# Patient Record
Sex: FEMALE | Race: BLACK OR AFRICAN AMERICAN | NOT HISPANIC OR LATINO | Employment: PART TIME | URBAN - METROPOLITAN AREA
[De-identification: names, ages, dates, MRNs, and addresses within clinical notes are randomized per-mention and may not be internally consistent; named-entity substitution may affect disease eponyms.]

---

## 2023-03-02 PROBLEM — E11.9 CONTROLLED TYPE 2 DIABETES MELLITUS WITHOUT COMPLICATION, WITH LONG-TERM CURRENT USE OF INSULIN (MULTI): Status: ACTIVE | Noted: 2023-03-02

## 2023-03-02 PROBLEM — E66.811 CLASS 1 OBESITY WITH BODY MASS INDEX (BMI) OF 32.0 TO 32.9 IN ADULT: Status: ACTIVE | Noted: 2023-03-02

## 2023-03-02 PROBLEM — Z79.4 CONTROLLED TYPE 2 DIABETES MELLITUS WITHOUT COMPLICATION, WITH LONG-TERM CURRENT USE OF INSULIN (MULTI): Status: ACTIVE | Noted: 2023-03-02

## 2023-03-02 PROBLEM — F11.90 OPIOID USE: Status: ACTIVE | Noted: 2023-03-02

## 2023-03-02 PROBLEM — R06.2 WHEEZING ON AUSCULTATION: Status: ACTIVE | Noted: 2023-03-02

## 2023-03-02 PROBLEM — J30.9 ALLERGIC RHINITIS: Status: ACTIVE | Noted: 2023-03-02

## 2023-03-02 PROBLEM — N95.1 MENOPAUSAL SYMPTOMS: Status: ACTIVE | Noted: 2023-03-02

## 2023-03-02 PROBLEM — I10 HYPERTENSION, BENIGN: Status: ACTIVE | Noted: 2023-03-02

## 2023-03-02 PROBLEM — R23.2 HOT FLASHES: Status: ACTIVE | Noted: 2023-03-02

## 2023-03-02 PROBLEM — M54.50 BACK PAIN, LUMBOSACRAL: Status: ACTIVE | Noted: 2023-03-02

## 2023-03-02 PROBLEM — M25.531 RIGHT WRIST PAIN: Status: ACTIVE | Noted: 2023-03-02

## 2023-03-02 PROBLEM — K21.00 GERD WITH ESOPHAGITIS: Status: ACTIVE | Noted: 2023-03-02

## 2023-03-02 PROBLEM — E66.9 CLASS 1 OBESITY WITH BODY MASS INDEX (BMI) OF 32.0 TO 32.9 IN ADULT: Status: ACTIVE | Noted: 2023-03-02

## 2023-03-02 PROBLEM — B35.1 ONYCHOMYCOSIS: Status: ACTIVE | Noted: 2023-03-02

## 2023-03-02 PROBLEM — R05.9 COUGH: Status: ACTIVE | Noted: 2023-03-02

## 2023-03-02 PROBLEM — J18.9 COMMUNITY ACQUIRED PNEUMONIA: Status: ACTIVE | Noted: 2023-03-02

## 2023-03-02 PROBLEM — L98.9 SKIN LESION OF BACK: Status: ACTIVE | Noted: 2023-03-02

## 2023-03-02 PROBLEM — J10.1 INFLUENZA A: Status: ACTIVE | Noted: 2023-03-02

## 2023-03-02 RX ORDER — CYCLOBENZAPRINE HCL 10 MG
10 TABLET ORAL 2 TIMES DAILY PRN
COMMUNITY
Start: 2021-06-19 | End: 2023-06-09 | Stop reason: SDUPTHER

## 2023-03-02 RX ORDER — BLOOD-GLUCOSE METER
EACH MISCELLANEOUS 2 TIMES DAILY
COMMUNITY
Start: 2021-02-22

## 2023-03-02 RX ORDER — POTASSIUM CHLORIDE 20 MEQ/1
1 TABLET, EXTENDED RELEASE ORAL DAILY
COMMUNITY
End: 2024-01-12 | Stop reason: SDUPTHER

## 2023-03-02 RX ORDER — PANTOPRAZOLE SODIUM 40 MG/1
1 TABLET, DELAYED RELEASE ORAL DAILY
COMMUNITY
Start: 2020-11-30 | End: 2023-03-23 | Stop reason: SDUPTHER

## 2023-03-02 RX ORDER — LISINOPRIL AND HYDROCHLOROTHIAZIDE 12.5; 2 MG/1; MG/1
1 TABLET ORAL DAILY
COMMUNITY
Start: 2022-09-13 | End: 2023-03-23 | Stop reason: SDUPTHER

## 2023-03-02 RX ORDER — MOMETASONE FUROATE 50 UG/1
1 SPRAY, METERED NASAL DAILY
COMMUNITY
Start: 2020-11-30

## 2023-03-02 RX ORDER — HUMAN INSULIN 100 [IU]/ML
60 INJECTION, SUSPENSION SUBCUTANEOUS DAILY
COMMUNITY
Start: 2020-10-08 | End: 2023-03-26 | Stop reason: DRUGHIGH

## 2023-03-02 RX ORDER — CICLOPIROX 80 MG/ML
SOLUTION TOPICAL
COMMUNITY
Start: 2022-10-13 | End: 2024-01-12 | Stop reason: WASHOUT

## 2023-03-02 RX ORDER — METFORMIN HYDROCHLORIDE 500 MG/1
2 TABLET, EXTENDED RELEASE ORAL 2 TIMES DAILY
COMMUNITY
Start: 2022-07-28 | End: 2024-01-12 | Stop reason: SINTOL

## 2023-03-02 RX ORDER — ROSUVASTATIN CALCIUM 5 MG/1
1 TABLET, COATED ORAL DAILY
COMMUNITY
Start: 2022-10-18 | End: 2024-01-12 | Stop reason: SINTOL

## 2023-03-02 RX ORDER — BLOOD SUGAR DIAGNOSTIC
STRIP MISCELLANEOUS DAILY
COMMUNITY

## 2023-03-02 RX ORDER — HYDROCODONE BITARTRATE AND ACETAMINOPHEN 5; 325 MG/1; MG/1
TABLET ORAL 3 TIMES DAILY PRN
COMMUNITY
Start: 2021-12-19 | End: 2023-03-20

## 2023-03-02 RX ORDER — IBUPROFEN 800 MG/1
1 TABLET ORAL 3 TIMES DAILY PRN
COMMUNITY
Start: 2020-11-30 | End: 2023-03-23 | Stop reason: SDUPTHER

## 2023-03-02 RX ORDER — ASPIRIN 81 MG/1
1 TABLET ORAL DAILY
COMMUNITY
Start: 2020-11-02

## 2023-03-20 ENCOUNTER — OFFICE VISIT (OUTPATIENT)
Dept: PRIMARY CARE | Facility: CLINIC | Age: 68
End: 2023-03-20
Payer: MEDICARE

## 2023-03-20 VITALS
SYSTOLIC BLOOD PRESSURE: 130 MMHG | RESPIRATION RATE: 16 BRPM | BODY MASS INDEX: 31.89 KG/M2 | HEIGHT: 63 IN | HEART RATE: 102 BPM | OXYGEN SATURATION: 95 % | WEIGHT: 180 LBS | DIASTOLIC BLOOD PRESSURE: 70 MMHG

## 2023-03-20 DIAGNOSIS — Z79.4 CONTROLLED TYPE 2 DIABETES MELLITUS WITHOUT COMPLICATION, WITH LONG-TERM CURRENT USE OF INSULIN (MULTI): Primary | ICD-10-CM

## 2023-03-20 DIAGNOSIS — E11.9 CONTROLLED TYPE 2 DIABETES MELLITUS WITHOUT COMPLICATION, WITH LONG-TERM CURRENT USE OF INSULIN (MULTI): Primary | ICD-10-CM

## 2023-03-20 DIAGNOSIS — F11.90 OPIOID USE: ICD-10-CM

## 2023-03-20 DIAGNOSIS — I10 HYPERTENSION, BENIGN: ICD-10-CM

## 2023-03-20 DIAGNOSIS — E66.9 CLASS 1 OBESITY WITH SERIOUS COMORBIDITY AND BODY MASS INDEX (BMI) OF 31.0 TO 31.9 IN ADULT, UNSPECIFIED OBESITY TYPE: ICD-10-CM

## 2023-03-20 DIAGNOSIS — K21.00 GASTROESOPHAGEAL REFLUX DISEASE WITH ESOPHAGITIS WITHOUT HEMORRHAGE: ICD-10-CM

## 2023-03-20 DIAGNOSIS — M54.50 BACK PAIN, LUMBOSACRAL: ICD-10-CM

## 2023-03-20 LAB — POC HEMOGLOBIN A1C: 11.5 % (ref 4.2–6.5)

## 2023-03-20 PROCEDURE — 1159F MED LIST DOCD IN RCRD: CPT | Performed by: FAMILY MEDICINE

## 2023-03-20 PROCEDURE — 3075F SYST BP GE 130 - 139MM HG: CPT | Performed by: FAMILY MEDICINE

## 2023-03-20 PROCEDURE — 99214 OFFICE O/P EST MOD 30 MIN: CPT | Performed by: FAMILY MEDICINE

## 2023-03-20 PROCEDURE — 3008F BODY MASS INDEX DOCD: CPT | Performed by: FAMILY MEDICINE

## 2023-03-20 PROCEDURE — 83036 HEMOGLOBIN GLYCOSYLATED A1C: CPT | Performed by: FAMILY MEDICINE

## 2023-03-20 PROCEDURE — 1160F RVW MEDS BY RX/DR IN RCRD: CPT | Performed by: FAMILY MEDICINE

## 2023-03-20 PROCEDURE — 1036F TOBACCO NON-USER: CPT | Performed by: FAMILY MEDICINE

## 2023-03-20 PROCEDURE — 3078F DIAST BP <80 MM HG: CPT | Performed by: FAMILY MEDICINE

## 2023-03-20 RX ORDER — HYDROCODONE BITARTRATE AND ACETAMINOPHEN 10; 325 MG/1; MG/1
1 TABLET ORAL EVERY 4 HOURS PRN
COMMUNITY
Start: 2022-06-06 | End: 2023-03-20 | Stop reason: SDUPTHER

## 2023-03-20 RX ORDER — NALOXONE HYDROCHLORIDE 1 MG/ML
0.4 INJECTION INTRAMUSCULAR; INTRAVENOUS; SUBCUTANEOUS AS NEEDED
Qty: 4 ML | Status: SHIPPED | OUTPATIENT
Start: 2023-03-20 | End: 2024-03-19

## 2023-03-20 RX ORDER — HYDROCODONE BITARTRATE AND ACETAMINOPHEN 10; 325 MG/1; MG/1
1 TABLET ORAL EVERY 4 HOURS PRN
Qty: 30 TABLET | Refills: 0 | Status: SHIPPED | OUTPATIENT
Start: 2023-03-20 | End: 2023-06-09 | Stop reason: SDUPTHER

## 2023-03-20 ASSESSMENT — PATIENT HEALTH QUESTIONNAIRE - PHQ9
2. FEELING DOWN, DEPRESSED OR HOPELESS: NOT AT ALL
1. LITTLE INTEREST OR PLEASURE IN DOING THINGS: NOT AT ALL
SUM OF ALL RESPONSES TO PHQ9 QUESTIONS 1 AND 2: 0

## 2023-03-20 ASSESSMENT — PAIN SCALES - GENERAL: PAINLEVEL: 6

## 2023-03-20 ASSESSMENT — ENCOUNTER SYMPTOMS: HYPERTENSION: 1

## 2023-03-20 NOTE — PROGRESS NOTES
Subjective   Patient ID: Brittany Brown is a 67 y.o. female who presents for Diabetes and Hypertension.  Having urinary frequency, and urgency, and may have a little leakage before gets there.  Since beginning of the year.  Is getting worse.  Mouth stays very dry.    Glucose 130-140 in AM  If eats sweets or lat in day, will be high in AM.  Has been eating a lot of vanilla tootsie rolls.    Right knee pain and ongoing back pain  Uses hydrocodone when bad.        Diabetes  She presents for her follow-up diabetic visit. She has type 2 diabetes mellitus. Her disease course has been worsening. There are no hypoglycemic associated symptoms. Associated symptoms include polyuria and weight loss. Pertinent negatives for diabetes include no blurred vision, no chest pain and no foot paresthesias. Risk factors for coronary artery disease include diabetes mellitus, dyslipidemia, hypertension and obesity.   Hypertension  This is a chronic problem. The current episode started more than 1 year ago. The problem is unchanged. The problem is controlled. Pertinent negatives include no blurred vision, chest pain or shortness of breath.   Pain  This is a chronic problem. The current episode started more than 1 year ago. The problem occurs intermittently. The problem is unchanged. The pain is present in the right knee and lower back. The pain is severe. Pertinent negatives include no chest pain or shortness of breath. Past treatments include prescription narcotic, rest, OTC NSAID, cold pack and acetaminophen. The treatment provided moderate relief. Her past medical history is significant for chronic back pain.       Review of Systems   Constitutional:  Positive for weight loss.   Eyes:  Negative for blurred vision.   Respiratory:  Negative for shortness of breath.    Cardiovascular:  Negative for chest pain.   Endocrine: Positive for polyuria.       Objective   /70 (BP Location: Right arm, Patient Position: Sitting, BP Cuff Size:  "Large adult)   Pulse 102   Resp 16   Ht 1.6 m (5' 3\")   Wt 81.6 kg (180 lb)   SpO2 95%   BMI 31.89 kg/m²     Physical Exam  Constitutional:       Appearance: Normal appearance.   Cardiovascular:      Rate and Rhythm: Normal rate and regular rhythm.      Heart sounds: No murmur heard.  Musculoskeletal:      Right lower leg: No edema.      Left lower leg: No edema.   Neurological:      Mental Status: She is alert.           Assessment/Plan   Problem List Items Addressed This Visit          Circulatory    Hypertension, benign     BP controlled.  Continue lisinopril-hydrochlorothiazide 20-25            Digestive    GERD with esophagitis     Taking pantoprazole.            Endocrine/Metabolic    Class 1 obesity with serious comorbidity and body mass index (BMI) of 31.0 to 31.9 in adult    Controlled type 2 diabetes mellitus without complication, with long-term current use of insulin (CMS/[facility]) - Primary     A1C 11.5, up from 10.3 in October.  Increase NPH insulin to 70 units daily.  Continue metformin  mg, 2 tabs daily.  Get back on track with diet, exercise.  Follow up 3 months.  If not coming down, will need to add medication, and consider [facility] pharmacy referral vs endocrinologist.         Relevant Medications    HYDROcodone-acetaminophen (Norco)  mg tablet    Other Relevant Orders    POCT glycosylated hemoglobin (Hb A1C) manually resulted (Completed)       Other    Back pain, lumbosacral     Using oxycodone periodically, when bad.   Urine Drug Screen and Controlled Substance Agreement  were done October 13, 2022.         Relevant Orders    Disability Placard    Opioid use    Relevant Medications    naloxone (Narcan) 1 mg/mL injection          "

## 2023-03-21 ENCOUNTER — TELEPHONE (OUTPATIENT)
Dept: PRIMARY CARE | Facility: CLINIC | Age: 68
End: 2023-03-21

## 2023-03-21 ENCOUNTER — APPOINTMENT (OUTPATIENT)
Dept: PRIMARY CARE | Facility: CLINIC | Age: 68
End: 2023-03-21
Payer: MEDICARE

## 2023-03-21 DIAGNOSIS — M54.50 BACK PAIN, LUMBOSACRAL: Primary | ICD-10-CM

## 2023-03-23 DIAGNOSIS — M54.50 BACK PAIN, LUMBOSACRAL: ICD-10-CM

## 2023-03-23 DIAGNOSIS — I10 HYPERTENSION, BENIGN: Primary | ICD-10-CM

## 2023-03-23 DIAGNOSIS — K21.00 GASTROESOPHAGEAL REFLUX DISEASE WITH ESOPHAGITIS WITHOUT HEMORRHAGE: ICD-10-CM

## 2023-03-23 RX ORDER — IBUPROFEN 800 MG/1
800 TABLET ORAL 3 TIMES DAILY PRN
Qty: 90 TABLET | Refills: 0 | Status: SHIPPED | OUTPATIENT
Start: 2023-03-23 | End: 2023-07-25 | Stop reason: SDUPTHER

## 2023-03-23 RX ORDER — PANTOPRAZOLE SODIUM 40 MG/1
40 TABLET, DELAYED RELEASE ORAL DAILY
Qty: 90 TABLET | Refills: 1 | Status: SHIPPED | OUTPATIENT
Start: 2023-03-23 | End: 2023-09-06 | Stop reason: SDUPTHER

## 2023-03-23 RX ORDER — LISINOPRIL AND HYDROCHLOROTHIAZIDE 12.5; 2 MG/1; MG/1
1 TABLET ORAL DAILY
Qty: 90 TABLET | Refills: 1 | Status: SHIPPED | OUTPATIENT
Start: 2023-03-23 | End: 2023-09-06 | Stop reason: SDUPTHER

## 2023-03-26 PROBLEM — J18.9 COMMUNITY ACQUIRED PNEUMONIA: Status: RESOLVED | Noted: 2023-03-02 | Resolved: 2023-03-26

## 2023-03-26 PROBLEM — R06.2 WHEEZING ON AUSCULTATION: Status: RESOLVED | Noted: 2023-03-02 | Resolved: 2023-03-26

## 2023-03-26 PROBLEM — J10.1 INFLUENZA A: Status: RESOLVED | Noted: 2023-03-02 | Resolved: 2023-03-26

## 2023-03-26 RX ORDER — HUMAN INSULIN 100 [IU]/ML
70 INJECTION, SUSPENSION SUBCUTANEOUS DAILY
Qty: 3 ML | Refills: 0 | Status: SHIPPED | OUTPATIENT
Start: 2023-03-26

## 2023-03-26 ASSESSMENT — ENCOUNTER SYMPTOMS
BLURRED VISION: 0
SHORTNESS OF BREATH: 0
WEIGHT LOSS: 1
PAIN: 1

## 2023-03-26 NOTE — ASSESSMENT & PLAN NOTE
A1C 11.5, up from 10.3 in October.  Increase NPH insulin to 70 units daily.  Continue metformin  mg, 2 tabs daily.  Get back on track with diet, exercise.  Follow up 3 months.  If not coming down, will need to add medication, and consider [facility] pharmacy referral vs endocrinologist.

## 2023-03-26 NOTE — ASSESSMENT & PLAN NOTE
Using oxycodone periodically, when bad.   Urine Drug Screen and Controlled Substance Agreement  were done October 13, 2022.

## 2023-03-26 NOTE — PATIENT INSTRUCTIONS
A1C 11.5, up from 10.3 in October.  Increase NPH insulin to 70 units daily.  Continue metformin  mg, 2 tabs daily.  Get back on track with diet, exercise.  Follow up 3 months.  If not coming down, will need to add medication, and consider [facility] pharmacy referral vs endocrinologist.    BP stable.  Continue current medicines.                                                                                                Blood Pressure Treatment goals include:                                                                                    BP of 120/80, with no higher than 140/85 on consistent basis.                               Exercise at 150 minutes weekly.  Eat a balanced diet, rich in fruits and vegetables, low in sodium and processed foods.  If you are overweight, work toward a goal BMI of less than 25.    Chronic intermittent back pain  Using oxycodone periodically, when bad.   Urine Drug Screen and Controlled Substance Agreement  were done October 13, 2022.  Narcan available for overdose.

## 2023-06-08 ENCOUNTER — TELEPHONE (OUTPATIENT)
Dept: PRIMARY CARE | Facility: CLINIC | Age: 68
End: 2023-06-08
Payer: MEDICARE

## 2023-06-09 DIAGNOSIS — E11.9 CONTROLLED TYPE 2 DIABETES MELLITUS WITHOUT COMPLICATION, WITH LONG-TERM CURRENT USE OF INSULIN (MULTI): ICD-10-CM

## 2023-06-09 DIAGNOSIS — Z79.4 CONTROLLED TYPE 2 DIABETES MELLITUS WITHOUT COMPLICATION, WITH LONG-TERM CURRENT USE OF INSULIN (MULTI): ICD-10-CM

## 2023-06-09 DIAGNOSIS — M54.50 BACK PAIN, LUMBOSACRAL: Primary | ICD-10-CM

## 2023-06-09 RX ORDER — HYDROCODONE BITARTRATE AND ACETAMINOPHEN 10; 325 MG/1; MG/1
1 TABLET ORAL EVERY 4 HOURS PRN
Qty: 30 TABLET | Refills: 0 | Status: SHIPPED | OUTPATIENT
Start: 2023-06-09 | End: 2023-07-25 | Stop reason: SDUPTHER

## 2023-06-09 RX ORDER — CYCLOBENZAPRINE HCL 10 MG
10 TABLET ORAL 2 TIMES DAILY PRN
Qty: 30 TABLET | Refills: 0 | Status: SHIPPED | OUTPATIENT
Start: 2023-06-09 | End: 2024-01-12 | Stop reason: SDUPTHER

## 2023-07-24 DIAGNOSIS — Z79.4 CONTROLLED TYPE 2 DIABETES MELLITUS WITHOUT COMPLICATION, WITH LONG-TERM CURRENT USE OF INSULIN (MULTI): Primary | ICD-10-CM

## 2023-07-24 DIAGNOSIS — E11.9 CONTROLLED TYPE 2 DIABETES MELLITUS WITHOUT COMPLICATION, WITH LONG-TERM CURRENT USE OF INSULIN (MULTI): Primary | ICD-10-CM

## 2023-07-25 DIAGNOSIS — M54.50 BACK PAIN, LUMBOSACRAL: ICD-10-CM

## 2023-07-25 RX ORDER — IBUPROFEN 800 MG/1
800 TABLET ORAL 3 TIMES DAILY PRN
Qty: 90 TABLET | Refills: 0 | Status: SHIPPED | OUTPATIENT
Start: 2023-07-25 | End: 2023-10-09 | Stop reason: SDUPTHER

## 2023-07-25 RX ORDER — HYDROCODONE BITARTRATE AND ACETAMINOPHEN 10; 325 MG/1; MG/1
1 TABLET ORAL EVERY 4 HOURS PRN
Qty: 30 TABLET | Refills: 0 | Status: SHIPPED | OUTPATIENT
Start: 2023-07-25 | End: 2023-10-09 | Stop reason: SDUPTHER

## 2023-09-06 DIAGNOSIS — K21.00 GASTROESOPHAGEAL REFLUX DISEASE WITH ESOPHAGITIS WITHOUT HEMORRHAGE: ICD-10-CM

## 2023-09-06 DIAGNOSIS — I10 HYPERTENSION, BENIGN: ICD-10-CM

## 2023-09-06 RX ORDER — PANTOPRAZOLE SODIUM 40 MG/1
40 TABLET, DELAYED RELEASE ORAL DAILY
Qty: 30 TABLET | Refills: 0 | Status: SHIPPED | OUTPATIENT
Start: 2023-09-06 | End: 2023-10-09 | Stop reason: SDUPTHER

## 2023-09-06 RX ORDER — LISINOPRIL AND HYDROCHLOROTHIAZIDE 12.5; 2 MG/1; MG/1
1 TABLET ORAL DAILY
Qty: 30 TABLET | Refills: 0 | Status: SHIPPED | OUTPATIENT
Start: 2023-09-06 | End: 2023-10-09 | Stop reason: SDUPTHER

## 2023-10-09 DIAGNOSIS — K21.00 GASTROESOPHAGEAL REFLUX DISEASE WITH ESOPHAGITIS WITHOUT HEMORRHAGE: ICD-10-CM

## 2023-10-09 DIAGNOSIS — M54.50 BACK PAIN, LUMBOSACRAL: ICD-10-CM

## 2023-10-09 DIAGNOSIS — I10 HYPERTENSION, BENIGN: ICD-10-CM

## 2023-10-09 RX ORDER — HYDROCODONE BITARTRATE AND ACETAMINOPHEN 10; 325 MG/1; MG/1
1 TABLET ORAL EVERY 4 HOURS PRN
Qty: 30 TABLET | Refills: 0 | Status: SHIPPED | OUTPATIENT
Start: 2023-10-09 | End: 2023-11-27 | Stop reason: SDUPTHER

## 2023-10-09 RX ORDER — LISINOPRIL AND HYDROCHLOROTHIAZIDE 12.5; 2 MG/1; MG/1
1 TABLET ORAL DAILY
Qty: 30 TABLET | Refills: 0 | Status: SHIPPED | OUTPATIENT
Start: 2023-10-09 | End: 2023-11-30

## 2023-10-09 RX ORDER — IBUPROFEN 800 MG/1
800 TABLET ORAL 3 TIMES DAILY PRN
Qty: 90 TABLET | Refills: 0 | Status: SHIPPED | OUTPATIENT
Start: 2023-10-09 | End: 2023-11-27 | Stop reason: SDUPTHER

## 2023-10-09 RX ORDER — PANTOPRAZOLE SODIUM 40 MG/1
40 TABLET, DELAYED RELEASE ORAL DAILY
Qty: 30 TABLET | Refills: 0 | Status: SHIPPED | OUTPATIENT
Start: 2023-10-09 | End: 2023-11-27 | Stop reason: SDUPTHER

## 2023-11-27 DIAGNOSIS — K21.00 GASTROESOPHAGEAL REFLUX DISEASE WITH ESOPHAGITIS WITHOUT HEMORRHAGE: ICD-10-CM

## 2023-11-27 DIAGNOSIS — M54.50 BACK PAIN, LUMBOSACRAL: ICD-10-CM

## 2023-11-27 RX ORDER — HYDROCODONE BITARTRATE AND ACETAMINOPHEN 10; 325 MG/1; MG/1
1 TABLET ORAL EVERY 4 HOURS PRN
Qty: 30 TABLET | Refills: 0 | Status: SHIPPED | OUTPATIENT
Start: 2023-11-27 | End: 2024-01-12 | Stop reason: SDUPTHER

## 2023-11-27 RX ORDER — IBUPROFEN 800 MG/1
800 TABLET ORAL 3 TIMES DAILY PRN
Qty: 90 TABLET | Refills: 0 | Status: SHIPPED | OUTPATIENT
Start: 2023-11-27 | End: 2024-01-12 | Stop reason: SDUPTHER

## 2023-11-27 RX ORDER — PANTOPRAZOLE SODIUM 40 MG/1
40 TABLET, DELAYED RELEASE ORAL DAILY
Qty: 30 TABLET | Refills: 0 | Status: SHIPPED | OUTPATIENT
Start: 2023-11-27 | End: 2024-01-12 | Stop reason: SDUPTHER

## 2024-01-11 ENCOUNTER — OFFICE VISIT (OUTPATIENT)
Dept: PRIMARY CARE | Facility: CLINIC | Age: 69
End: 2024-01-11
Payer: MEDICARE

## 2024-01-11 VITALS
HEIGHT: 60 IN | HEART RATE: 95 BPM | WEIGHT: 174.8 LBS | BODY MASS INDEX: 34.32 KG/M2 | OXYGEN SATURATION: 98 % | DIASTOLIC BLOOD PRESSURE: 70 MMHG | SYSTOLIC BLOOD PRESSURE: 136 MMHG

## 2024-01-11 DIAGNOSIS — E66.09 CLASS 1 OBESITY DUE TO EXCESS CALORIES WITH SERIOUS COMORBIDITY AND BODY MASS INDEX (BMI) OF 34.0 TO 34.9 IN ADULT: ICD-10-CM

## 2024-01-11 DIAGNOSIS — I10 HYPERTENSION, BENIGN: ICD-10-CM

## 2024-01-11 DIAGNOSIS — Z79.4 TYPE 2 DIABETES MELLITUS WITH HYPERGLYCEMIA, WITH LONG-TERM CURRENT USE OF INSULIN (MULTI): Primary | ICD-10-CM

## 2024-01-11 DIAGNOSIS — Z79.4 CONTROLLED TYPE 2 DIABETES MELLITUS WITHOUT COMPLICATION, WITH LONG-TERM CURRENT USE OF INSULIN (MULTI): ICD-10-CM

## 2024-01-11 DIAGNOSIS — M25.561 CHRONIC PAIN OF RIGHT KNEE: ICD-10-CM

## 2024-01-11 DIAGNOSIS — G89.29 OTHER CHRONIC PAIN: ICD-10-CM

## 2024-01-11 DIAGNOSIS — M54.50 BACK PAIN, LUMBOSACRAL: ICD-10-CM

## 2024-01-11 DIAGNOSIS — E11.9 CONTROLLED TYPE 2 DIABETES MELLITUS WITHOUT COMPLICATION, WITH LONG-TERM CURRENT USE OF INSULIN (MULTI): ICD-10-CM

## 2024-01-11 DIAGNOSIS — K21.00 GASTROESOPHAGEAL REFLUX DISEASE WITH ESOPHAGITIS WITHOUT HEMORRHAGE: ICD-10-CM

## 2024-01-11 DIAGNOSIS — Z12.31 BREAST CANCER SCREENING BY MAMMOGRAM: ICD-10-CM

## 2024-01-11 DIAGNOSIS — F11.90 OPIOID USE: ICD-10-CM

## 2024-01-11 DIAGNOSIS — E11.65 TYPE 2 DIABETES MELLITUS WITH HYPERGLYCEMIA, WITH LONG-TERM CURRENT USE OF INSULIN (MULTI): Primary | ICD-10-CM

## 2024-01-11 DIAGNOSIS — F51.01 PRIMARY INSOMNIA: ICD-10-CM

## 2024-01-11 DIAGNOSIS — E87.6 HYPOKALEMIA: ICD-10-CM

## 2024-01-11 DIAGNOSIS — G89.29 CHRONIC PAIN OF RIGHT KNEE: ICD-10-CM

## 2024-01-11 DIAGNOSIS — I70.203 ATHEROSCLEROSIS OF NATIVE ARTERY OF BOTH LOWER EXTREMITIES, WITH UNSPECIFIED PRESENCE OF CLINICAL MANIFESTATION (CMS-HCC): ICD-10-CM

## 2024-01-11 LAB
AMPHETAMINES UR QL SCN: NORMAL
BARBITURATES UR QL SCN: NORMAL
BENZODIAZ UR QL SCN: NORMAL
BZE UR QL SCN: NORMAL
CANNABINOIDS UR QL SCN: NORMAL
FENTANYL+NORFENTANYL UR QL SCN: NORMAL
OPIATES UR QL SCN: NORMAL
OXYCODONE+OXYMORPHONE UR QL SCN: NORMAL
PCP UR QL SCN: NORMAL
POC HEMOGLOBIN A1C: 12.9 % (ref 4.2–6.5)

## 2024-01-11 PROCEDURE — 1125F AMNT PAIN NOTED PAIN PRSNT: CPT | Performed by: FAMILY MEDICINE

## 2024-01-11 PROCEDURE — 83036 HEMOGLOBIN GLYCOSYLATED A1C: CPT | Performed by: FAMILY MEDICINE

## 2024-01-11 PROCEDURE — 3078F DIAST BP <80 MM HG: CPT | Performed by: FAMILY MEDICINE

## 2024-01-11 PROCEDURE — 1160F RVW MEDS BY RX/DR IN RCRD: CPT | Performed by: FAMILY MEDICINE

## 2024-01-11 PROCEDURE — 1159F MED LIST DOCD IN RCRD: CPT | Performed by: FAMILY MEDICINE

## 2024-01-11 PROCEDURE — 80307 DRUG TEST PRSMV CHEM ANLYZR: CPT

## 2024-01-11 PROCEDURE — 1036F TOBACCO NON-USER: CPT | Performed by: FAMILY MEDICINE

## 2024-01-11 PROCEDURE — 3075F SYST BP GE 130 - 139MM HG: CPT | Performed by: FAMILY MEDICINE

## 2024-01-11 PROCEDURE — 3008F BODY MASS INDEX DOCD: CPT | Performed by: FAMILY MEDICINE

## 2024-01-11 PROCEDURE — 99215 OFFICE O/P EST HI 40 MIN: CPT | Performed by: FAMILY MEDICINE

## 2024-01-11 RX ORDER — TRAZODONE HYDROCHLORIDE 50 MG/1
50 TABLET ORAL NIGHTLY PRN
Qty: 30 TABLET | Refills: 0 | Status: SHIPPED | OUTPATIENT
Start: 2024-01-11 | End: 2024-02-19 | Stop reason: SDUPTHER

## 2024-01-11 ASSESSMENT — ENCOUNTER SYMPTOMS
LOSS OF SENSATION IN FEET: 0
OCCASIONAL FEELINGS OF UNSTEADINESS: 0
DEPRESSION: 0

## 2024-01-11 ASSESSMENT — PATIENT HEALTH QUESTIONNAIRE - PHQ9
SUM OF ALL RESPONSES TO PHQ9 QUESTIONS 1 AND 2: 1
2. FEELING DOWN, DEPRESSED OR HOPELESS: NOT AT ALL
1. LITTLE INTEREST OR PLEASURE IN DOING THINGS: SEVERAL DAYS

## 2024-01-11 NOTE — PROGRESS NOTES
Subjective   Patient ID: Brittany Brown is a 68 y.o. female who presents for Knee Pain and Back Pain (Patient presents today with severe Right Knee Pain and Low Back Pain That radiates from Buttocks Up Until Mid Back.  Patient has had bladder Frequency and would Like To discuss Insulin.).  Patient presents for routine check after  absence of nearly a year.  Reviewed medications  being taking.  For diabetes, not taking metformin, due to diarrhea.  Glucose low  of 69.  Mostly less than 150.  Checks fasting, and prior  to taking Novolin N shot.  Using novolin insulin 78 units at night  Will  increase  a   bit if glucose is high.  Has not been taking ASA.  Stopped rosuvastatin due to possible muscle aches from it.    Not checking BP at home.  Taking lisinopril  hydrochlorothiazide without problem.    Right knee  pain, getting worse.  Had TKR in 2018.  Had seen Dr. Jang, but he does not want to do surgery (per patient),  due to risks of complications.  She would like to see another ortho for second  opinion.  Uses Vicodin to help when pain is bad, and to help sleep.    Wants  something  to help sleep.  10 mg melatonin doesn't always help.  Will use vicodin at times, but is careful  about taking  it too often.  Wakes to urinate as well.  Will use it to sleep  when knee pain is  bad    Continues on pantoprazole for GERD    OARRS:  Valencia Keller MD on 1/12/2024  7:13 AM  I have personally reviewed the OARRS report for Brittany Brown. I have considered the risks of abuse, dependence, addiction and diversion and I believe that it is clinically appropriate for Brittany Brown to be prescribed this medication    Is the patient prescribed a combination of a benzodiazepine and opioid?  No    Last Urine Drug Screen / ordered today: Yes  Recent Results (from the past 8760 hour(s))  -Drug Screen, Urine With Reflex to Confirmation:   Collection Time: 01/11/24  5:06 PM       Result                      Value             Ref Range            Amphetamine Screen, Ur*                       Presumptive *   Presumptive Negative       Barbiturate Screen, Ur*                       Presumptive *   Presumptive Negative       Benzodiazepines Screen*                       Presumptive *   Presumptive Negative       Cannabinoid Screen, Ur*                       Presumptive *   Presumptive Negative       Cocaine Metabolite Scr*                       Presumptive *   Presumptive Negative       Fentanyl Screen, Urine                        Presumptive *   Presumptive Negative       Opiate Screen, Urine                          Presumptive *   Presumptive Negative       Oxycodone Screen, Urine                       Presumptive *   Presumptive Negative       PCP Screen, Urine                             Presumptive *   Presumptive Negative  Results are as expected.         Controlled Substance Agreement:  Date of the Last Agreement: 10/13/22  Reviewed Controlled Substance Agreement including but not limited to the benefits, risks, and alternatives to treatment with a Controlled Substance medication(s).    Opioids:  What is the patient's goal of therapy? Help with severe pain  Is this being achieved with current treatment? yes    I have calculated the patient's Morphine Dose Equivalent (MED):   I have considered referral to Pain Management and/or a specialist, and do not feel it is necessary at this time.    I feel that it is clinically indicated to continue this current medication regimen after consideration of alternative therapies, and other non-opioid treatment.    Opioid Risk Screening:  Family History of Substance Abuse  Alcohol: 0 (1/12/2024  7:00 AM)  Illegal Drugs: 0 (1/12/2024  7:00 AM)  Prescription Drugs: 0 (1/12/2024  7:00 AM)    Personal History of Substance Abuse  Alcohol: 0 (1/12/2024  7:00 AM)  Illegal Drugs: 0 (1/12/2024  7:00 AM)  Prescription Drugs: 0 (1/12/2024  7:00 AM)    Patient Age (16-45)  Age (16-45): 0 (1/12/2024  7:00 AM)    History of  Preadolescent Sexual Abuse  History of Preadolescent Sexual Abuse: .0 (1/12/2024  7:00 AM)    Psychological Disease  Attention Deficit Disorder, Obsessive Compulsive Disorder, Bipolar, Schizophrenia: 0 (1/12/2024  7:00 AM)  Depression: 0 (1/12/2024  7:00 AM)    Total Score  Total Score: 0 (1/12/2024  7:00 AM)    Total Score Risk Category  TOTAL SCORE CATEGORY: Low Risk (0-3) (1/12/2024  7:00 AM)      Pain Assessment:  Analgesia  What was your pain level on average during the past week?: 7  What was your pain level at its worst during the past week?: 8  What percentage of your pain has been relieved during the past week?: 85 %  Is the amount of pain relief you are now obtaining from your current pain relievers enough to make a real difference in your life?: Y  Query to Clinician: Is the patient's pain relief clinically significant?: Yes    Activities of Daily Living  Physical Functioning: Better  Family Relationships: Better  Social Relationships: Better  Mood: Better  Sleep Patterns: Better  Overall Functioning: Better    Adverse Events  Is patient experiencing any side effects from current pain relievers?: N  Patient's Overall Severity of Side Effects: None      Assessment  Is your overall impression that this patient is benefiting from opioid therapy?: Yes  Specific Analgesic Plan: Continue present regimen            Review of Systems    Objective   /70 (BP Location: Left arm, Patient Position: Sitting, BP Cuff Size: Adult)   Pulse 95   Ht 1.524 m (5')   Wt 79.3 kg (174 lb 12.8 oz)   SpO2 98%   BMI 34.14 kg/m²     Physical Exam  Vitals reviewed.   Constitutional:       Appearance: Normal appearance.   Cardiovascular:      Rate and Rhythm: Normal rate and regular rhythm.      Heart sounds: No murmur heard.  Pulmonary:      Effort: Pulmonary effort is normal.      Breath sounds: Normal breath sounds.   Musculoskeletal:      Right lower leg: No edema.      Left lower leg: No edema.   Neurological:       Mental Status: She is alert.   Psychiatric:         Mood and Affect: Mood normal.         Behavior: Behavior normal.           Assessment/Plan   Problem List Items Addressed This Visit       Back pain, lumbosacral    Relevant Medications    ibuprofen 800 mg tablet    HYDROcodone-acetaminophen (Norco)  mg tablet    cyclobenzaprine (Flexeril) 10 mg tablet    GERD with esophagitis    Relevant Medications    pantoprazole (ProtoNix) 40 mg EC tablet    Hypertension, benign    Relevant Medications    lisinopriL-hydrochlorothiazide 20-12.5 mg tablet    Opioid use    Relevant Orders    Drug Screen, Urine With Reflex to Confirmation (Completed)    Type 2 diabetes mellitus with hyperglycemia, with long-term current use of insulin (CMS/[facility]) - Primary    Atherosclerosis of native artery of both lower extremities, with unspecified presence of clinical manifestation (CMS/HCC)     Other Visit Diagnoses       Chronic pain of right knee        Relevant Orders    Referral to Orthopaedic Surgery    Primary insomnia        Relevant Medications    traZODone (Desyrel) 50 mg tablet    Other chronic pain        Relevant Orders    Drug Screen, Urine With Reflex to Confirmation (Completed)    Breast cancer screening by mammogram        Relevant Orders    BI mammo bilateral screening tomosynthesis    Hypokalemia        Relevant Medications    potassium chloride CR 20 mEq ER tablet    Class 1 obesity due to excess calories with serious comorbidity and body mass index (BMI) of 34.0 to 34.9 in adult

## 2024-01-12 PROBLEM — I70.203 ATHEROSCLEROSIS OF NATIVE ARTERY OF BOTH LOWER EXTREMITIES, WITH UNSPECIFIED PRESENCE OF CLINICAL MANIFESTATION (CMS-HCC): Status: ACTIVE | Noted: 2024-01-12

## 2024-01-12 PROBLEM — E11.65 TYPE 2 DIABETES MELLITUS WITH HYPERGLYCEMIA, WITH LONG-TERM CURRENT USE OF INSULIN (MULTI): Status: ACTIVE | Noted: 2023-03-02

## 2024-01-12 RX ORDER — POTASSIUM CHLORIDE 20 MEQ/1
20 TABLET, EXTENDED RELEASE ORAL DAILY
Qty: 90 TABLET | Refills: 1 | Status: SHIPPED | OUTPATIENT
Start: 2024-01-12

## 2024-01-12 RX ORDER — LISINOPRIL AND HYDROCHLOROTHIAZIDE 12.5; 2 MG/1; MG/1
1 TABLET ORAL DAILY
Qty: 90 TABLET | Refills: 1 | Status: SHIPPED | OUTPATIENT
Start: 2024-01-12

## 2024-01-12 RX ORDER — CYCLOBENZAPRINE HCL 10 MG
10 TABLET ORAL 2 TIMES DAILY PRN
Qty: 30 TABLET | Refills: 0 | Status: SHIPPED | OUTPATIENT
Start: 2024-01-12 | End: 2024-02-11

## 2024-01-12 RX ORDER — PANTOPRAZOLE SODIUM 40 MG/1
40 TABLET, DELAYED RELEASE ORAL DAILY
Qty: 90 TABLET | Refills: 1 | Status: SHIPPED | OUTPATIENT
Start: 2024-01-12 | End: 2024-07-10

## 2024-01-12 RX ORDER — IBUPROFEN 800 MG/1
800 TABLET ORAL 3 TIMES DAILY PRN
Qty: 90 TABLET | Refills: 0 | Status: SHIPPED | OUTPATIENT
Start: 2024-01-12

## 2024-01-12 RX ORDER — HYDROCODONE BITARTRATE AND ACETAMINOPHEN 10; 325 MG/1; MG/1
1 TABLET ORAL EVERY 4 HOURS PRN
Qty: 30 TABLET | Refills: 0 | Status: SHIPPED | OUTPATIENT
Start: 2024-01-12 | End: 2024-05-13 | Stop reason: SDUPTHER

## 2024-01-12 ASSESSMENT — LIFESTYLE VARIABLES: TOTAL SCORE: 0

## 2024-01-12 NOTE — PATIENT INSTRUCTIONS
Diabetes with poor control, A1C 12.9.  Risk of  complications occurring  are high if control is not improved.  Currently using 78 units insulin N at night.  Refer to  pharmacy for  help with medication management.  Refer to dietitian  for help with dietary changes.  Restart  aspirin 81 mg daily.  Should be on statin to reduce risk of heart attack and stroke.  Will see where lipids are before restarting.                                           Treatment goals include:  HgbA1C less than 7.0  /80 on consistent basis, with no higher than 140/85  LDL cholesterol less than 100, and HDL cholesterol above 40.  Yearly retinal exam  Check feet periodically for sores or decreased sensation  Exercise at least 150 minutes weekly.  Work toward a goal BMI of less than 25.    BP controlled.  Continue lisinopril hydrochlorothiazide    For right knee pain after TKR,  refer to orthopedist.    To help sleep, will try trazodone 50 mg daily.  Dose could be adjusted if needed.    For chronic pain, using vicodin when  bad.  Urine Drug Screen done  today and Controlled Substance Agreement done 10/13/22 (will need to be updated).  Follow up in 3  months.    Check fasting blood work.  Fast for 12 hours prior to having blood drawn.  You should drink plenty of water, and can have black tea or black coffee, if you'd like.

## 2024-02-08 ENCOUNTER — HOSPITAL ENCOUNTER (OUTPATIENT)
Dept: RADIOLOGY | Facility: CLINIC | Age: 69
Discharge: HOME | End: 2024-02-08
Payer: MEDICARE

## 2024-02-08 DIAGNOSIS — Z12.31 BREAST CANCER SCREENING BY MAMMOGRAM: ICD-10-CM

## 2024-02-08 PROCEDURE — 77067 SCR MAMMO BI INCL CAD: CPT | Performed by: RADIOLOGY

## 2024-02-08 PROCEDURE — 77063 BREAST TOMOSYNTHESIS BI: CPT | Performed by: RADIOLOGY

## 2024-02-08 PROCEDURE — 77067 SCR MAMMO BI INCL CAD: CPT

## 2024-02-19 DIAGNOSIS — F51.01 PRIMARY INSOMNIA: ICD-10-CM

## 2024-02-19 RX ORDER — TRAZODONE HYDROCHLORIDE 50 MG/1
50 TABLET ORAL NIGHTLY PRN
Qty: 30 TABLET | Refills: 3 | Status: SHIPPED | OUTPATIENT
Start: 2024-02-19 | End: 2025-02-18

## 2024-05-06 DIAGNOSIS — M54.50 BACK PAIN, LUMBOSACRAL: ICD-10-CM

## 2024-05-07 RX ORDER — HYDROCODONE BITARTRATE AND ACETAMINOPHEN 10; 325 MG/1; MG/1
1 TABLET ORAL EVERY 4 HOURS PRN
Qty: 30 TABLET | Refills: 0 | OUTPATIENT
Start: 2024-05-07

## 2024-05-13 ENCOUNTER — TELEPHONE (OUTPATIENT)
Dept: PRIMARY CARE | Facility: CLINIC | Age: 69
End: 2024-05-13
Payer: MEDICARE

## 2024-05-13 DIAGNOSIS — M54.50 BACK PAIN, LUMBOSACRAL: ICD-10-CM

## 2024-05-13 RX ORDER — HYDROCODONE BITARTRATE AND ACETAMINOPHEN 10; 325 MG/1; MG/1
1 TABLET ORAL EVERY 4 HOURS PRN
Qty: 30 TABLET | Refills: 0 | Status: SHIPPED | OUTPATIENT
Start: 2024-05-13

## 2024-05-13 RX ORDER — NALOXONE HYDROCHLORIDE 4 MG/.1ML
1 SPRAY NASAL AS NEEDED
Qty: 2 EACH | Refills: 0 | Status: SHIPPED | OUTPATIENT
Start: 2024-05-13

## 2024-06-10 ENCOUNTER — OFFICE VISIT (OUTPATIENT)
Dept: PRIMARY CARE | Facility: CLINIC | Age: 69
End: 2024-06-10
Payer: MEDICARE

## 2024-06-10 VITALS
BODY MASS INDEX: 34.44 KG/M2 | HEIGHT: 60 IN | WEIGHT: 175.4 LBS | OXYGEN SATURATION: 97 % | SYSTOLIC BLOOD PRESSURE: 131 MMHG | TEMPERATURE: 98.2 F | HEART RATE: 105 BPM | DIASTOLIC BLOOD PRESSURE: 70 MMHG

## 2024-06-10 DIAGNOSIS — E11.65 TYPE 2 DIABETES MELLITUS WITH HYPERGLYCEMIA, WITH LONG-TERM CURRENT USE OF INSULIN (MULTI): Primary | ICD-10-CM

## 2024-06-10 DIAGNOSIS — I10 HYPERTENSION, BENIGN: ICD-10-CM

## 2024-06-10 DIAGNOSIS — M54.50 BACK PAIN, LUMBOSACRAL: ICD-10-CM

## 2024-06-10 DIAGNOSIS — E66.09 CLASS 1 OBESITY DUE TO EXCESS CALORIES WITH SERIOUS COMORBIDITY AND BODY MASS INDEX (BMI) OF 34.0 TO 34.9 IN ADULT: ICD-10-CM

## 2024-06-10 DIAGNOSIS — Z79.4 TYPE 2 DIABETES MELLITUS WITH HYPERGLYCEMIA, WITH LONG-TERM CURRENT USE OF INSULIN (MULTI): Primary | ICD-10-CM

## 2024-06-10 LAB — POC HEMOGLOBIN A1C: 10.9 % (ref 4.2–6.5)

## 2024-06-10 PROCEDURE — 1158F ADVNC CARE PLAN TLK DOCD: CPT | Performed by: FAMILY MEDICINE

## 2024-06-10 PROCEDURE — 1160F RVW MEDS BY RX/DR IN RCRD: CPT | Performed by: FAMILY MEDICINE

## 2024-06-10 PROCEDURE — 3008F BODY MASS INDEX DOCD: CPT | Performed by: FAMILY MEDICINE

## 2024-06-10 PROCEDURE — 99214 OFFICE O/P EST MOD 30 MIN: CPT | Performed by: FAMILY MEDICINE

## 2024-06-10 PROCEDURE — 1123F ACP DISCUSS/DSCN MKR DOCD: CPT | Performed by: FAMILY MEDICINE

## 2024-06-10 PROCEDURE — 83036 HEMOGLOBIN GLYCOSYLATED A1C: CPT | Performed by: FAMILY MEDICINE

## 2024-06-10 PROCEDURE — 1159F MED LIST DOCD IN RCRD: CPT | Performed by: FAMILY MEDICINE

## 2024-06-10 PROCEDURE — 3078F DIAST BP <80 MM HG: CPT | Performed by: FAMILY MEDICINE

## 2024-06-10 PROCEDURE — 3075F SYST BP GE 130 - 139MM HG: CPT | Performed by: FAMILY MEDICINE

## 2024-06-10 ASSESSMENT — ANXIETY QUESTIONNAIRES
GAD7 TOTAL SCORE: 0
3. WORRYING TOO MUCH ABOUT DIFFERENT THINGS: NOT AT ALL
5. BEING SO RESTLESS THAT IT IS HARD TO SIT STILL: NOT AT ALL
2. NOT BEING ABLE TO STOP OR CONTROL WORRYING: NOT AT ALL
7. FEELING AFRAID AS IF SOMETHING AWFUL MIGHT HAPPEN: NOT AT ALL
6. BECOMING EASILY ANNOYED OR IRRITABLE: NOT AT ALL
1. FEELING NERVOUS, ANXIOUS, OR ON EDGE: NOT AT ALL
4. TROUBLE RELAXING: NOT AT ALL

## 2024-06-10 ASSESSMENT — ENCOUNTER SYMPTOMS
LOSS OF SENSATION IN FEET: 0
DEPRESSION: 0
OCCASIONAL FEELINGS OF UNSTEADINESS: 0

## 2024-06-10 NOTE — PROGRESS NOTES
Subjective   Patient ID: Brittany Brown is a 69 y.o. female who presents for Follow-up (MEDICATION REFILLS ).  Patient here for follow up of diabetes, BP, and narcotic pain control.    For diabetes, has been trying to  eat better.  Trying to eat les sweets, especially in the evening.  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  Last office visit, she had been referred to nutritionist and  pharmacy for help with DM.    Never saw nutritionist.  Maybe got a call from pharmacy, but apparently never connected.  A1C last time was 12.9.    Gets a little cough when she is out at some places.  Gets itchy.  Doesn't happen at home.  No wheezing.  Does have allergies.    Right side gets a stitch at times    Trazodone helps with sleep when she needs it, but doesn't use every night.  Not groggy in AM.    Continues to use hydrocodone when she has bad pain, but not frequently.OARRS:  No data recorded  I have personally reviewed the OARRS report for Brittany Brown. I have considered the risks of abuse, dependence, addiction and diversion and I believe that it is clinically appropriate for Brittany Brown to be prescribed this medication    Is the patient prescribed a combination of a benzodiazepine and opioid?  No    Last Urine Drug Screen / ordered today: No  Recent Results (from the past 8760 hour(s))  -Drug Screen, Urine With Reflex to Confirmation:   Collection Time: 01/11/24  5:06 PM       Result                      Value             Ref Range           Amphetamine Screen, Ur*                       Presumptive *   Presumptive Negative       Barbiturate Screen, Ur*                       Presumptive *   Presumptive Negative       Benzodiazepines Screen*                       Presumptive *   Presumptive Negative       Cannabinoid Screen, Ur*                       Presumptive *   Presumptive Negative       Cocaine Metabolite Scr*                       Presumptive *   Presumptive Negative        Fentanyl Screen, Urine                        Presumptive *   Presumptive Negative       Opiate Screen, Urine                          Presumptive *   Presumptive Negative       Oxycodone Screen, Urine                       Presumptive *   Presumptive Negative       PCP Screen, Urine                             Presumptive *   Presumptive Negative  Results are as expected.     Controlled Substance Agreement:  Date of the Last Agreement: 6/10/24  Reviewed Controlled Substance Agreement including but not limited to the benefits, risks, and alternatives to treatment with a Controlled Substance medication(s).    Opioids:  What is the patient's goal of therapy? Help manage pain when bad.  Is this being achieved with current treatment? yes    I have calculated the patient's Morphine Dose Equivalent (MED):   I have considered referral to Pain Management and/or a specialist, and do not feel it is necessary at this time.    I feel that it is clinically indicated to continue this current medication regimen after consideration of alternative therapies, and other non-opioid treatment.    Opioid Risk Screening:  Family History of Substance Abuse  Alcohol: 0 (1/12/2024  7:00 AM)  Illegal Drugs: 0 (1/12/2024  7:00 AM)  Prescription Drugs: 0 (1/12/2024  7:00 AM)    Personal History of Substance Abuse  Alcohol: 0 (1/12/2024  7:00 AM)  Illegal Drugs: 0 (1/12/2024  7:00 AM)  Prescription Drugs: 0 (1/12/2024  7:00 AM)    Patient Age (16-45)  Age (16-45): 0 (1/12/2024  7:00 AM)    History of Preadolescent Sexual Abuse  History of Preadolescent Sexual Abuse: .0 (1/12/2024  7:00 AM)    Psychological Disease  Attention Deficit Disorder, Obsessive Compulsive Disorder, Bipolar, Schizophrenia: 0 (1/12/2024  7:00 AM)  Depression: 0 (1/12/2024  7:00 AM)    Total Score  Total Score: 0 (1/12/2024  7:00 AM)    Total Score Risk Category  TOTAL SCORE CATEGORY: Low Risk (0-3) (1/12/2024  7:00 AM)      Pain Assessment:  No data  recorded        Diabetes  She presents for her follow-up diabetic visit. She has type 2 diabetes mellitus. Her disease course has been improving. There are no hypoglycemic associated symptoms. Pertinent negatives for diabetes include no blurred vision, no chest pain, no foot paresthesias, no foot ulcerations and no weight loss. There are no diabetic complications. Risk factors for coronary artery disease include diabetes mellitus, dyslipidemia, hypertension, obesity and post-menopausal. Current diabetic treatment includes insulin injections. She has not had a previous visit with a dietitian. She never participates in exercise. An ACE inhibitor/angiotensin II receptor blocker is being taken.       Review of Systems   Constitutional:  Negative for weight loss.   Eyes:  Negative for blurred vision.   Cardiovascular:  Negative for chest pain.       Objective   /70   Pulse 105   Temp 36.8 °C (98.2 °F)   Ht 1.524 m (5')   Wt 79.6 kg (175 lb 6.4 oz)   SpO2 97%   BMI 34.26 kg/m²     Physical Exam  Vitals reviewed.   Constitutional:       Appearance: Normal appearance.   Cardiovascular:      Rate and Rhythm: Normal rate and regular rhythm.      Heart sounds: No murmur heard.  Pulmonary:      Effort: Pulmonary effort is normal.      Breath sounds: Normal breath sounds.   Musculoskeletal:      Right lower leg: No edema.      Left lower leg: No edema.   Neurological:      Mental Status: She is alert.           Assessment/Plan   Problem List Items Addressed This Visit       Back pain, lumbosacral    Hypertension, benign    Type 2 diabetes mellitus with hyperglycemia, with long-term current use of insulin (Multi) - Primary    Relevant Orders    POCT glycosylated hemoglobin (Hb A1C) manually resulted (Completed)     Other Visit Diagnoses       Class 1 obesity due to excess calories with serious comorbidity and body mass index (BMI) of 34.0 to 34.9 in adult

## 2024-06-11 PROBLEM — E66.811 CLASS 1 OBESITY WITH SERIOUS COMORBIDITY AND BODY MASS INDEX (BMI) OF 31.0 TO 31.9 IN ADULT: Status: RESOLVED | Noted: 2023-03-02 | Resolved: 2024-06-11

## 2024-06-11 PROBLEM — E66.9 CLASS 1 OBESITY WITH SERIOUS COMORBIDITY AND BODY MASS INDEX (BMI) OF 31.0 TO 31.9 IN ADULT: Status: RESOLVED | Noted: 2023-03-02 | Resolved: 2024-06-11

## 2024-06-11 ASSESSMENT — ENCOUNTER SYMPTOMS
WEIGHT LOSS: 0
BLURRED VISION: 0

## 2024-06-11 NOTE — PATIENT INSTRUCTIONS
Diabetes with poor control, A1C 10.9 (12.9 last time).  Risk of  complications occurring  are high if control is not improved.  Currently using 78 units insulin N at night.  Refer to  pharmacy for  help with medication management.  Refer to dietitian  for help with dietary changes.  Continue  aspirin 81 mg daily.  Should be on statin to reduce risk of heart attack and stroke.                                       Treatment goals include:  HgbA1C less than 7.0  /80 on consistent basis, with no higher than 140/85  LDL cholesterol less than 100, and HDL cholesterol above 40.  Yearly retinal exam  Check feet periodically for sores or decreased sensation  Exercise at least 150 minutes weekly.  Work toward a goal BMI of less than 25.     BP controlled.  Continue lisinopril hydrochlorothiazide     For sleep, trazodone 50 mg as needed, has been helpful.     For chronic pain, using vicodin when  bad.  Urine Drug Screen done 1/11/24 and Controlled Substance Agreement done today, 6/11/24.  Follow up in 3  months.     Check fasting blood work.  Fast for 12 hours prior to having blood drawn.  You should drink plenty of water, and can have black tea or black coffee, if you'd like.

## 2024-06-14 ENCOUNTER — LAB (OUTPATIENT)
Dept: LAB | Facility: LAB | Age: 69
End: 2024-06-14
Payer: MEDICARE

## 2024-06-14 DIAGNOSIS — E11.9 CONTROLLED TYPE 2 DIABETES MELLITUS WITHOUT COMPLICATION, WITH LONG-TERM CURRENT USE OF INSULIN (MULTI): ICD-10-CM

## 2024-06-14 DIAGNOSIS — Z79.4 CONTROLLED TYPE 2 DIABETES MELLITUS WITHOUT COMPLICATION, WITH LONG-TERM CURRENT USE OF INSULIN (MULTI): ICD-10-CM

## 2024-06-14 LAB
ANION GAP SERPL CALC-SCNC: 18 MMOL/L (ref 10–20)
BUN SERPL-MCNC: 13 MG/DL (ref 6–23)
CALCIUM SERPL-MCNC: 9.6 MG/DL (ref 8.6–10.6)
CHLORIDE SERPL-SCNC: 95 MMOL/L (ref 98–107)
CHOLEST SERPL-MCNC: 239 MG/DL (ref 0–199)
CHOLESTEROL/HDL RATIO: 3.4
CO2 SERPL-SCNC: 27 MMOL/L (ref 21–32)
CREAT SERPL-MCNC: 0.82 MG/DL (ref 0.5–1.05)
CREAT UR-MCNC: 221.9 MG/DL (ref 20–320)
EGFRCR SERPLBLD CKD-EPI 2021: 77 ML/MIN/1.73M*2
GLUCOSE SERPL-MCNC: 314 MG/DL (ref 74–99)
HDLC SERPL-MCNC: 69.3 MG/DL
LDLC SERPL CALC-MCNC: 148 MG/DL
MICROALBUMIN UR-MCNC: 329.2 MG/L
MICROALBUMIN/CREAT UR: 148.4 UG/MG CREAT
NON HDL CHOLESTEROL: 170 MG/DL (ref 0–149)
POTASSIUM SERPL-SCNC: 3.3 MMOL/L (ref 3.5–5.3)
SODIUM SERPL-SCNC: 137 MMOL/L (ref 136–145)
TRIGL SERPL-MCNC: 109 MG/DL (ref 0–149)
VLDL: 22 MG/DL (ref 0–40)

## 2024-06-14 PROCEDURE — 36415 COLL VENOUS BLD VENIPUNCTURE: CPT

## 2024-06-14 PROCEDURE — 82043 UR ALBUMIN QUANTITATIVE: CPT

## 2024-06-14 PROCEDURE — 80061 LIPID PANEL: CPT

## 2024-06-14 PROCEDURE — 82570 ASSAY OF URINE CREATININE: CPT

## 2024-06-14 PROCEDURE — 80048 BASIC METABOLIC PNL TOTAL CA: CPT

## 2024-07-08 DIAGNOSIS — K21.00 GASTROESOPHAGEAL REFLUX DISEASE WITH ESOPHAGITIS WITHOUT HEMORRHAGE: ICD-10-CM

## 2024-07-08 DIAGNOSIS — I10 HYPERTENSION, BENIGN: ICD-10-CM

## 2024-07-08 RX ORDER — PANTOPRAZOLE SODIUM 40 MG/1
40 TABLET, DELAYED RELEASE ORAL DAILY
Qty: 90 TABLET | Refills: 1 | Status: SHIPPED | OUTPATIENT
Start: 2024-07-08 | End: 2025-01-04

## 2024-07-08 RX ORDER — LISINOPRIL AND HYDROCHLOROTHIAZIDE 12.5; 2 MG/1; MG/1
1 TABLET ORAL DAILY
Qty: 90 TABLET | Refills: 1 | Status: SHIPPED | OUTPATIENT
Start: 2024-07-08

## 2024-08-05 ENCOUNTER — TELEPHONE (OUTPATIENT)
Dept: PRIMARY CARE | Facility: CLINIC | Age: 69
End: 2024-08-05
Payer: MEDICARE

## 2024-08-05 DIAGNOSIS — Z79.4 TYPE 2 DIABETES MELLITUS WITH HYPERGLYCEMIA, WITH LONG-TERM CURRENT USE OF INSULIN (MULTI): Primary | ICD-10-CM

## 2024-08-05 DIAGNOSIS — E11.65 TYPE 2 DIABETES MELLITUS WITH HYPERGLYCEMIA, WITH LONG-TERM CURRENT USE OF INSULIN (MULTI): Primary | ICD-10-CM

## 2024-08-05 DIAGNOSIS — M54.50 BACK PAIN, LUMBOSACRAL: ICD-10-CM

## 2024-08-05 RX ORDER — HYDROCODONE BITARTRATE AND ACETAMINOPHEN 10; 325 MG/1; MG/1
1 TABLET ORAL EVERY 4 HOURS PRN
Qty: 30 TABLET | Refills: 0 | Status: SHIPPED | OUTPATIENT
Start: 2024-08-05

## 2024-08-05 RX ORDER — IBUPROFEN 800 MG/1
800 TABLET ORAL 3 TIMES DAILY PRN
Qty: 90 TABLET | Refills: 0 | Status: SHIPPED | OUTPATIENT
Start: 2024-08-05

## 2024-08-05 NOTE — TELEPHONE ENCOUNTER
Refill request/ [facility] [id number]   HYDROcodone-acetaminophen (Norco)  mg tablet      ibuprofen 800 mg tablet

## 2024-08-12 ENCOUNTER — APPOINTMENT (OUTPATIENT)
Dept: PHARMACY | Facility: HOSPITAL | Age: 69
End: 2024-08-12
Payer: MEDICARE

## 2024-10-07 ENCOUNTER — TELEPHONE (OUTPATIENT)
Dept: PRIMARY CARE | Facility: CLINIC | Age: 69
End: 2024-10-07
Payer: MEDICARE

## 2024-10-07 DIAGNOSIS — E11.65 TYPE 2 DIABETES MELLITUS WITH HYPERGLYCEMIA, WITH LONG-TERM CURRENT USE OF INSULIN: Primary | ICD-10-CM

## 2024-10-07 DIAGNOSIS — Z79.4 TYPE 2 DIABETES MELLITUS WITH HYPERGLYCEMIA, WITH LONG-TERM CURRENT USE OF INSULIN: Primary | ICD-10-CM

## 2024-10-07 RX ORDER — HUMAN INSULIN 100 [IU]/ML
70 INJECTION, SUSPENSION SUBCUTANEOUS DAILY
Qty: 3 ML | Refills: 0 | Status: SHIPPED | OUTPATIENT
Start: 2024-10-07

## 2024-10-07 NOTE — TELEPHONE ENCOUNTER
Rx Refill Request Telephone Encounter    Name:  Brittany Brown  :  [id number]  Medication Name:    insulin NPH, Isophane, (NovoLIN N FlexPen) 100 unit/mL     Specific Pharmacy location:  [facility]   [phone]  Date of last appointment:  6/10/24

## 2024-11-19 DIAGNOSIS — M54.50 BACK PAIN, LUMBOSACRAL: ICD-10-CM

## 2024-11-19 RX ORDER — CYCLOBENZAPRINE HCL 10 MG
10 TABLET ORAL 2 TIMES DAILY PRN
Qty: 30 TABLET | Refills: 0 | Status: SHIPPED | OUTPATIENT
Start: 2024-11-19 | End: 2024-12-19

## 2024-11-19 RX ORDER — HYDROCODONE BITARTRATE AND ACETAMINOPHEN 10; 325 MG/1; MG/1
1 TABLET ORAL EVERY 4 HOURS PRN
Qty: 30 TABLET | Refills: 0 | Status: SHIPPED | OUTPATIENT
Start: 2024-11-19

## 2024-11-19 NOTE — TELEPHONE ENCOUNTER
I have personally reviewed the OARRS report for this patient.  This report is documented into the EMR.  I have considered the risks of abuse, dependence, addiction and diversion.  Has appointment scheduled

## 2024-12-23 ENCOUNTER — APPOINTMENT (OUTPATIENT)
Dept: PRIMARY CARE | Facility: CLINIC | Age: 69
End: 2024-12-23
Payer: MEDICARE

## 2024-12-23 VITALS
DIASTOLIC BLOOD PRESSURE: 78 MMHG | TEMPERATURE: 97.1 F | OXYGEN SATURATION: 99 % | SYSTOLIC BLOOD PRESSURE: 116 MMHG | HEART RATE: 120 BPM | WEIGHT: 176 LBS | BODY MASS INDEX: 34.37 KG/M2

## 2024-12-23 DIAGNOSIS — E66.811 CLASS 1 OBESITY WITH SERIOUS COMORBIDITY AND BODY MASS INDEX (BMI) OF 34.0 TO 34.9 IN ADULT, UNSPECIFIED OBESITY TYPE: ICD-10-CM

## 2024-12-23 DIAGNOSIS — E11.65 TYPE 2 DIABETES MELLITUS WITH HYPERGLYCEMIA, WITH LONG-TERM CURRENT USE OF INSULIN: Primary | ICD-10-CM

## 2024-12-23 DIAGNOSIS — Z79.4 TYPE 2 DIABETES MELLITUS WITH DIABETIC PERIPHERAL ANGIOPATHY WITHOUT GANGRENE, WITH LONG-TERM CURRENT USE OF INSULIN (MULTI): ICD-10-CM

## 2024-12-23 DIAGNOSIS — I10 HYPERTENSION, BENIGN: ICD-10-CM

## 2024-12-23 DIAGNOSIS — Z79.4 TYPE 2 DIABETES MELLITUS WITH HYPERGLYCEMIA, WITH LONG-TERM CURRENT USE OF INSULIN: Primary | ICD-10-CM

## 2024-12-23 DIAGNOSIS — M54.50 BACK PAIN, LUMBOSACRAL: ICD-10-CM

## 2024-12-23 DIAGNOSIS — E11.51 TYPE 2 DIABETES MELLITUS WITH DIABETIC PERIPHERAL ANGIOPATHY WITHOUT GANGRENE, WITH LONG-TERM CURRENT USE OF INSULIN (MULTI): ICD-10-CM

## 2024-12-23 DIAGNOSIS — E78.5 HYPERLIPIDEMIA, UNSPECIFIED HYPERLIPIDEMIA TYPE: ICD-10-CM

## 2024-12-23 DIAGNOSIS — M25.561 CHRONIC PAIN OF RIGHT KNEE: ICD-10-CM

## 2024-12-23 DIAGNOSIS — G89.29 CHRONIC PAIN OF RIGHT KNEE: ICD-10-CM

## 2024-12-23 DIAGNOSIS — K43.9 VENTRAL HERNIA WITHOUT OBSTRUCTION OR GANGRENE: ICD-10-CM

## 2024-12-23 LAB — POC HEMOGLOBIN A1C: 11.9 % (ref 4.2–6.5)

## 2024-12-23 PROCEDURE — 1160F RVW MEDS BY RX/DR IN RCRD: CPT | Performed by: FAMILY MEDICINE

## 2024-12-23 PROCEDURE — 3050F LDL-C >= 130 MG/DL: CPT | Performed by: FAMILY MEDICINE

## 2024-12-23 PROCEDURE — 3078F DIAST BP <80 MM HG: CPT | Performed by: FAMILY MEDICINE

## 2024-12-23 PROCEDURE — 1125F AMNT PAIN NOTED PAIN PRSNT: CPT | Performed by: FAMILY MEDICINE

## 2024-12-23 PROCEDURE — 3074F SYST BP LT 130 MM HG: CPT | Performed by: FAMILY MEDICINE

## 2024-12-23 PROCEDURE — 3062F POS MACROALBUMINURIA REV: CPT | Performed by: FAMILY MEDICINE

## 2024-12-23 PROCEDURE — 1159F MED LIST DOCD IN RCRD: CPT | Performed by: FAMILY MEDICINE

## 2024-12-23 PROCEDURE — 1123F ACP DISCUSS/DSCN MKR DOCD: CPT | Performed by: FAMILY MEDICINE

## 2024-12-23 PROCEDURE — 4010F ACE/ARB THERAPY RXD/TAKEN: CPT | Performed by: FAMILY MEDICINE

## 2024-12-23 PROCEDURE — 99215 OFFICE O/P EST HI 40 MIN: CPT | Performed by: FAMILY MEDICINE

## 2024-12-23 PROCEDURE — 83036 HEMOGLOBIN GLYCOSYLATED A1C: CPT | Performed by: FAMILY MEDICINE

## 2024-12-23 ASSESSMENT — PAIN SCALES - GENERAL: PAINLEVEL_OUTOF10: 10-WORST PAIN EVER

## 2024-12-23 NOTE — PROGRESS NOTES
Subjective   Patient ID: Brittany Brown is a 69 y.o. female who presents for Diabetes Mellitus.  Patient here for follow up on diabetes, HTN, opioid use, and concern over abdominal hernia.  A1C today 11.9.  Using   70 units NPH.  Checks glucose in AM and at night before taking the insulin.  Number she is getting do not coincide with an A1C as high as we are getting.  Last blood work was done in June with , GFR 77, glucose 318, elevated urinary albumin, A1C 10.9    She has had urinary frequency.  Waking at night to urinate.  Only taking lisinopril hydrochlorothiazide every other day, although that hasn't helped the frequency.  She does drink a lot of water, in part because she is always thirsty.    Discussed last cholesterol being high, and the risk she is at for having stroke, heart attack.  She had pravastatin in the past, but did not tolerate  it (doesn't recall exactly what the side effects were).  Rosuvastatin is seen on her med list from a couple years ago, but she doesn't recall taking that one.    Drinks diet coke 3 cans a day.  Also drinks a lot of water.    Uses hydrocodone at night if pain is bad.  Doesn't use daily.    Has had right knee replacement years ago.  Has been having problems with it.  The ROM is restricted, and there is a lot of pain.  Dr. Jang did the surgery, and she had followed up with him previously.  At that time, he recommended doing more PT.  She would like another opinion.    She notes bulging in abdomen for about a year, along her hernia scar.  No pain.  Is more notable when standing.          Review of Systems    Objective   /78 (BP Location: Right arm, Patient Position: Sitting, BP Cuff Size: Large adult)   Pulse (!) 120   Temp 36.2 °C (97.1 °F) (Temporal)   Wt 79.8 kg (176 lb)   SpO2 99%   BMI 34.37 kg/m²     Physical Exam  Vitals reviewed.   Constitutional:       Appearance: Normal appearance.   Cardiovascular:      Rate and Rhythm: Normal rate and regular  rhythm.      Heart sounds: No murmur heard.  Pulmonary:      Effort: Pulmonary effort is normal.      Breath sounds: Normal breath sounds.   Abdominal:      General: A surgical scar is present. There is no distension.      Palpations: Abdomen is soft.      Tenderness: There is no abdominal tenderness.      Hernia: A hernia is present. Hernia is present in the ventral area (mid-surgical scar).   Musculoskeletal:      Right knee: Swelling present. Decreased range of motion.      Right lower leg: No edema.      Left lower leg: No edema.   Neurological:      Mental Status: She is alert.           Assessment/Plan   Problem List Items Addressed This Visit       Back pain, lumbosacral    Relevant Medications    HYDROcodone-acetaminophen (Norco)  mg tablet    Hypertension, benign    Relevant Medications    lisinopril 20 mg tablet    Type 2 diabetes mellitus with hyperglycemia, with long-term current use of insulin - Primary    Relevant Medications    blood-glucose sensor (DivergenceStyle Pooja 3 Sensor) device    Other Relevant Orders    POCT glycosylated hemoglobin (Hb A1C) manually resulted (Completed)    Referral to Clinical Pharmacy    Type 2 diabetes mellitus with diabetic peripheral angiopathy without gangrene, with long-term current use of insulin (Multi)     Followed by podiatry          Other Visit Diagnoses       Chronic pain of right knee        Relevant Orders    Referral to Orthopaedic Surgery    Hyperlipidemia, unspecified hyperlipidemia type        Relevant Medications    rosuvastatin (Crestor) 5 mg tablet    Ventral hernia without obstruction or gangrene        Relevant Orders    Referral to General Surgery    Class 1 obesity with serious comorbidity and body mass index (BMI) of 34.0 to 34.9 in adult, unspecified obesity type

## 2024-12-24 ENCOUNTER — TELEPHONE (OUTPATIENT)
Dept: PRIMARY CARE | Facility: CLINIC | Age: 69
End: 2024-12-24
Payer: MEDICARE

## 2024-12-24 DIAGNOSIS — Z79.4 TYPE 2 DIABETES MELLITUS WITH HYPERGLYCEMIA, WITH LONG-TERM CURRENT USE OF INSULIN: ICD-10-CM

## 2024-12-24 DIAGNOSIS — E11.65 TYPE 2 DIABETES MELLITUS WITH HYPERGLYCEMIA, WITH LONG-TERM CURRENT USE OF INSULIN: ICD-10-CM

## 2024-12-24 DIAGNOSIS — E78.5 HYPERLIPIDEMIA, UNSPECIFIED HYPERLIPIDEMIA TYPE: ICD-10-CM

## 2024-12-24 DIAGNOSIS — M54.50 BACK PAIN, LUMBOSACRAL: ICD-10-CM

## 2024-12-24 DIAGNOSIS — I10 HYPERTENSION, BENIGN: ICD-10-CM

## 2024-12-24 PROBLEM — E11.51 TYPE 2 DIABETES MELLITUS WITH DIABETIC PERIPHERAL ANGIOPATHY WITHOUT GANGRENE, WITH LONG-TERM CURRENT USE OF INSULIN (MULTI): Status: ACTIVE | Noted: 2024-12-24

## 2024-12-24 RX ORDER — LISINOPRIL 20 MG/1
20 TABLET ORAL DAILY
Qty: 90 TABLET | Refills: 1 | Status: SHIPPED | OUTPATIENT
Start: 2024-12-24 | End: 2025-06-22

## 2024-12-24 RX ORDER — BLOOD-GLUCOSE SENSOR
EACH MISCELLANEOUS
Qty: 5 EACH | Refills: 1 | Status: SHIPPED | OUTPATIENT
Start: 2024-12-24

## 2024-12-24 RX ORDER — ROSUVASTATIN CALCIUM 5 MG/1
5 TABLET, COATED ORAL DAILY
Qty: 100 TABLET | Refills: 3 | Status: SHIPPED | OUTPATIENT
Start: 2024-12-24 | End: 2024-12-24 | Stop reason: SDUPTHER

## 2024-12-24 RX ORDER — HYDROCODONE BITARTRATE AND ACETAMINOPHEN 10; 325 MG/1; MG/1
1 TABLET ORAL EVERY 4 HOURS PRN
Qty: 30 TABLET | Refills: 0 | Status: SHIPPED | OUTPATIENT
Start: 2024-12-24

## 2024-12-24 RX ORDER — BLOOD-GLUCOSE SENSOR
EACH MISCELLANEOUS
Qty: 5 EACH | Refills: 1 | Status: SHIPPED | OUTPATIENT
Start: 2024-12-24 | End: 2024-12-24 | Stop reason: SDUPTHER

## 2024-12-24 RX ORDER — ROSUVASTATIN CALCIUM 5 MG/1
5 TABLET, COATED ORAL DAILY
Qty: 100 TABLET | Refills: 3 | Status: SHIPPED | OUTPATIENT
Start: 2024-12-24 | End: 2026-01-28

## 2024-12-24 RX ORDER — LISINOPRIL 20 MG/1
20 TABLET ORAL DAILY
Qty: 90 TABLET | Refills: 1 | Status: SHIPPED | OUTPATIENT
Start: 2024-12-24 | End: 2024-12-24 | Stop reason: SDUPTHER

## 2024-12-24 RX ORDER — HYDROCODONE BITARTRATE AND ACETAMINOPHEN 10; 325 MG/1; MG/1
1 TABLET ORAL EVERY 4 HOURS PRN
Qty: 30 TABLET | Refills: 0 | Status: SHIPPED | OUTPATIENT
Start: 2024-12-24 | End: 2024-12-24 | Stop reason: SDUPTHER

## 2024-12-24 NOTE — PATIENT INSTRUCTIONS
Diabetes with poor control, A1C 11.9, up from 10.9 last time.  Risk of  complications occurring  are high if control is not improved.  Currently using 70 units insulin N at night.  Refer to  pharmacy for  help with medication management.  Will also order continuous glucose monitor to track glucose better, and be able to adjust medication better.  Continue  aspirin 81 mg daily.  Should be on statin to reduce risk of heart attack and stroke.  Rosuvastatin 5 mg sent to pharmacy.                                   Treatment goals include:  HgbA1C less than 7.0  /80 on consistent basis, with no higher than 140/85  LDL cholesterol less than 100, and HDL cholesterol above 40.  Yearly retinal exam  Check feet periodically for sores or decreased sensation  Exercise at least 150 minutes weekly.  Work toward a goal BMI of less than 25.     BP controlled.  Will change medication to lisinopril alone, to see if it helps with the urinary frequency (frequency can also be due to high glucose)     For sleep, trazodone 50 mg as needed, has been helpful.     For chronic pain, using vicodin when  bad.  Urine Drug Screen done 1/11/24 and Controlled Substance Agreement done today, 6/11/24.    Refer to general surgery for abdominal hernia in surgical scar.    Refer to knee ortho for right knee pain and reduced range of motion after knee replacement.    Follow up in 3  months.  Will check fasting blood work at that time.

## 2024-12-30 ENCOUNTER — APPOINTMENT (OUTPATIENT)
Dept: PHARMACY | Facility: HOSPITAL | Age: 69
End: 2024-12-30
Payer: MEDICARE

## 2024-12-30 DIAGNOSIS — E11.65 TYPE 2 DIABETES MELLITUS WITH HYPERGLYCEMIA, WITH LONG-TERM CURRENT USE OF INSULIN: ICD-10-CM

## 2024-12-30 DIAGNOSIS — Z79.4 TYPE 2 DIABETES MELLITUS WITH HYPERGLYCEMIA, WITH LONG-TERM CURRENT USE OF INSULIN: ICD-10-CM

## 2024-12-30 NOTE — PROGRESS NOTES
Clinical Pharmacy Appointment    Patient ID: Brittayn Brown is a 69 y.o. female who presents for Diabetes.    Pt is here for First appointment.     Referring Provider: Valencia Keller MD  PCP: Valencia Keller MD   Last visit with PCP: 12/23/2024   Next visit with PCP: not scheduled      Subjective   HPI  DIABETES MELLITUS TYPE 2:    Current diabetic medications include:  Novolin N 100 units/mL; inject 70 units daily    Clarifications to above regimen: None  Adverse Effects: None    Past diabetic medications include:  Metformin XR (side effects)    Glucose Readings:  Glucometer/CGM Type: Glucometer (patient has a prescription for the FreeStyle Pooja 3 sensors, but it is not ready at this time)  Patient tests BG 1 times per day    Current home BG readings: Mostly ranging  mg/dL, has been as high as 220 mg/dL    Any episodes of hypoglycemia? No, no episodes of hypoglycemia .  Did patient treat episode of hypoglycemia appropriately? N/A  Does the patient have a prescription for ready-to-use Glucagon? No patient has not had any episodes of hypoglycemia  Does pt have proteinuria? Yes    Lifestyle:  Diet: 1-2 meals/day.  BK/LN: Oatmeal with   DN: Hot dog, cheese and crackers  Snacks: Chips  Drinks: Water, diet coke  Physical Activity: Nothing at this time (pain in knees)    Secondary Prevention:  Statin? Yes  ACE-I/ARB? Yes  Aspirin? Yes    Pertinent PMH Review:  PMH of Pancreatitis: No  PMH of Retinopathy: No  PMH of Urinary Tract Infections: No  PMH of MTC: No    Immunizations:  Influenza? Yes  COVID? Yes  Pneumonia? Yes  Shingles? Yes  Hepatitis B? Yes    Medication Reconciliation:  Changed:   Added:   Discontinued:     Drug Interactions  The following drug interactions were noted: Norco, cyclobenzaprine, trazodone can all cause CNS depressant effects.    Medication System Management  Patient's preferred pharmacy: [id number] in [place]    Objective   Allergies   Allergen Reactions    Adhesive  "Tape-Silicones Rash     SKIN PEELS OFF     Social History     Social History Narrative    Not on file      Medication Review  Current Outpatient Medications   Medication Instructions    aspirin 81 mg EC tablet 1 tablet, oral, Daily    blood sugar diagnostic (OneTouch Verio test strips) strip 2 times daily, test    blood-glucose sensor (FreeStyle Pooja 3 Sensor) device Use as directed to check glucose    cyclobenzaprine (FLEXERIL) 10 mg, oral, 2 times daily PRN    HYDROcodone-acetaminophen (Norco)  mg tablet 1 tablet, oral, Every 4 hours PRN    ibuprofen 800 mg, oral, 3 times daily PRN    lisinopril 20 mg, oral, Daily    mometasone (Nasonex) 50 mcg/actuation nasal spray 1 spray, Each Nostril, Daily    naloxone (NARCAN) 4 mg, nasal, As needed, May repeat every 2-3 minutes if needed, alternating nostrils, until medical assistance becomes available.    NovoLIN N FlexPen 70 Units, subcutaneous, Daily    pantoprazole (PROTONIX) 40 mg, oral, Daily    pen needle, diabetic 32 gauge x 3/16\" needle Daily, use as    potassium chloride CR 20 mEq ER tablet 20 mEq, oral, Daily    rosuvastatin (CRESTOR) 5 mg, oral, Daily    traZODone (DESYREL) 50 mg, oral, Nightly PRN      Vitals  BP Readings from Last 2 Encounters:   12/23/24 116/78   06/10/24 131/70     BMI Readings from Last 1 Encounters:   12/23/24 34.37 kg/m²      Labs  A1C  Lab Results   Component Value Date    HGBA1C 11.9 (A) 12/23/2024    HGBA1C 10.9 (A) 06/10/2024    HGBA1C 12.9 (A) 01/11/2024     BMP  Lab Results   Component Value Date    CALCIUM 9.6 06/14/2024     06/14/2024    K 3.3 (L) 06/14/2024    CO2 27 06/14/2024    CL 95 (L) 06/14/2024    BUN 13 06/14/2024    CREATININE 0.82 06/14/2024    EGFR 77 06/14/2024     LFTs  Lab Results   Component Value Date    ALT 23 10/13/2022    AST 21 10/13/2022    ALKPHOS 72 10/13/2022    BILITOT 0.7 10/13/2022     FLP  Lab Results   Component Value Date    TRIG 109 06/14/2024    CHOL 239 (H) 06/14/2024    LDLF 177 (H) " 10/13/2022    LDLCALC 148 (H) 06/14/2024    HDL 69.3 06/14/2024     Urine Microalbumin  Lab Results   Component Value Date    MICROALBCREA 148.4 (H) 06/14/2024     Weight Management  Wt Readings from Last 3 Encounters:   12/23/24 79.8 kg (176 lb)   06/10/24 79.6 kg (175 lb 6.4 oz)   01/11/24 79.3 kg (174 lb 12.8 oz)      There is no height or weight on file to calculate BMI.     Assessment/Plan   Problem List Items Addressed This Visit       Type 2 diabetes mellitus with hyperglycemia, with long-term current use of insulin     Patient's goal A1c is < 7%.  Is pt at goal? No, patient's most recent A1c from 12/23/2024 was 11.9%  Patient's SMBGs are mostly at goal, patient states that mostly her blood sugars stay below 200 mg/dL and are typically lower than that.     Rationale for plan: At this time, patient is waiting to get the FreeStyle Pooja 3 sensor to help monitor blood sugars. Will wait until patient gets that and can connect to [employer] system so that patient can share her readings with team.    Medication Changes:  CONTINUE  Novolin N 100 units/mL; inject 70 units daily    Future Considerations:  Based on data from FreeStyle Pooja sensor, can adjust medications as needed.    Monitoring and Education:  Counseled patient on goals of treatment (FBG <130 mg/dL; PPBG <180 mg/dL; A1c <7%)  Answered all patient questions and concerns            Clinical Pharmacist follow-up: 1/13/2025, Telehealth visit    Continue all meds under the continuation of care with the referring provider and clinical pharmacy team.    Thank you,  Lary Hogan, PharmD  Clinical Pharmacist - Primary Care  [phone]  12/30/2024    Verbal consent to manage patient's drug therapy was obtained from the patient. They were informed they may decline to participate or withdraw from participation in pharmacy services at any time.

## 2024-12-30 NOTE — ASSESSMENT & PLAN NOTE
Patient's goal A1c is < 7%.  Is pt at goal? No, patient's most recent A1c from 12/23/2024 was 11.9%  Patient's SMBGs are mostly at goal, patient states that mostly her blood sugars stay below 200 mg/dL and are typically lower than that.     Rationale for plan: At this time, patient is waiting to get the FreeStyle Pooja 3 sensor to help monitor blood sugars. Will wait until patient gets that and can connect to [employer] system so that patient can share her readings with team.    Medication Changes:  CONTINUE  Novolin N 100 units/mL; inject 70 units daily    Future Considerations:  Based on data from FreeStyle Pooja sensor, can adjust medications as needed.    Monitoring and Education:  Counseled patient on goals of treatment (FBG <130 mg/dL; PPBG <180 mg/dL; A1c <7%)  Answered all patient questions and concerns

## 2025-01-06 DIAGNOSIS — I10 HYPERTENSION, BENIGN: ICD-10-CM

## 2025-01-06 DIAGNOSIS — K21.00 GASTROESOPHAGEAL REFLUX DISEASE WITH ESOPHAGITIS WITHOUT HEMORRHAGE: ICD-10-CM

## 2025-01-06 RX ORDER — LISINOPRIL AND HYDROCHLOROTHIAZIDE 12.5; 2 MG/1; MG/1
1 TABLET ORAL DAILY
Qty: 90 TABLET | Refills: 1 | Status: CANCELLED | OUTPATIENT
Start: 2025-01-06

## 2025-01-06 RX ORDER — PANTOPRAZOLE SODIUM 40 MG/1
40 TABLET, DELAYED RELEASE ORAL DAILY
Qty: 90 TABLET | Refills: 1 | Status: SHIPPED | OUTPATIENT
Start: 2025-01-06 | End: 2025-07-05

## 2025-01-09 ENCOUNTER — HOSPITAL ENCOUNTER (OUTPATIENT)
Dept: RADIOLOGY | Facility: CLINIC | Age: 70
Discharge: HOME | End: 2025-01-09
Payer: MEDICARE

## 2025-01-09 ENCOUNTER — OFFICE VISIT (OUTPATIENT)
Dept: ORTHOPEDIC SURGERY | Facility: CLINIC | Age: 70
End: 2025-01-09
Payer: MEDICARE

## 2025-01-09 ENCOUNTER — LAB (OUTPATIENT)
Dept: LAB | Facility: LAB | Age: 70
End: 2025-01-09
Payer: MEDICARE

## 2025-01-09 DIAGNOSIS — G89.29 CHRONIC PAIN OF RIGHT KNEE: ICD-10-CM

## 2025-01-09 DIAGNOSIS — M25.561 CHRONIC PAIN OF RIGHT KNEE: ICD-10-CM

## 2025-01-09 DIAGNOSIS — Z96.651 HISTORY OF KNEE REPLACEMENT, TOTAL, RIGHT: ICD-10-CM

## 2025-01-09 LAB
CRP SERPL-MCNC: 1.22 MG/DL
ERYTHROCYTE [SEDIMENTATION RATE] IN BLOOD BY WESTERGREN METHOD: 79 MM/H (ref 0–30)

## 2025-01-09 PROCEDURE — 73562 X-RAY EXAM OF KNEE 3: CPT | Mod: RT

## 2025-01-09 PROCEDURE — 99213 OFFICE O/P EST LOW 20 MIN: CPT | Performed by: ORTHOPAEDIC SURGERY

## 2025-01-09 PROCEDURE — 1123F ACP DISCUSS/DSCN MKR DOCD: CPT | Performed by: ORTHOPAEDIC SURGERY

## 2025-01-09 PROCEDURE — 85652 RBC SED RATE AUTOMATED: CPT

## 2025-01-09 PROCEDURE — 86140 C-REACTIVE PROTEIN: CPT

## 2025-01-09 PROCEDURE — 99203 OFFICE O/P NEW LOW 30 MIN: CPT | Performed by: ORTHOPAEDIC SURGERY

## 2025-01-09 PROCEDURE — 4010F ACE/ARB THERAPY RXD/TAKEN: CPT | Performed by: ORTHOPAEDIC SURGERY

## 2025-01-09 NOTE — PROGRESS NOTES
Patient is a 69-year-old female who presents today for stiff right total knee replacement.  Her surgery was performed by Dr. Jang in 2018.  She had no problems after surgery regards to wound healing or infection.  She had had previous surgery to the knee prior to her knee replacement.  I do not know what her preoperative range of motion was like.  She takes Motrin.  She has not had any recent physical therapy.  She is a diabetic.    Right knee:  AAOx3, NAD, walks with a non-antalgic gait  Neutral allignment  Range of motion 10-90 stable to varus/valgus/anterior/posterior stress through out the range of motion  No no joint line tenderness to palpation  No effusion  SILT in a derik/saph/per/tib distribution  5/5 knee extension/df/pf/ehl  ½ dorsalis pedis and posterior tibial pulse  no popliteal lymphadenopathy  no other overlying lesions  mood: euthymic  Respirations non labored  Incision healed no signs of surrounding infection    Plain films were reviewed by myself in clinic today.  She has a Gaurav cruciate retaining knee replacement in place with no gross signs of loosening or failure.    I discussed her today that she is in a difficult position.  Getting a need to move that has been stiff for this long is very difficult.  I do not think further physical therapy would be of benefit for her.  There is no indication for revision at this time.  We will order an ESR and CRP to rule out infection although my clinical suspicion is low.  All of her questions were answered.  She can continue to follow-up with Dr. Jang.    This note was created using voice recognition software and was not corrected for typographical or grammatical errors.

## 2025-01-13 ENCOUNTER — APPOINTMENT (OUTPATIENT)
Dept: PHARMACY | Facility: HOSPITAL | Age: 70
End: 2025-01-13
Payer: MEDICARE

## 2025-01-13 DIAGNOSIS — E11.65 TYPE 2 DIABETES MELLITUS WITH HYPERGLYCEMIA, WITH LONG-TERM CURRENT USE OF INSULIN: ICD-10-CM

## 2025-01-13 DIAGNOSIS — Z79.4 TYPE 2 DIABETES MELLITUS WITH HYPERGLYCEMIA, WITH LONG-TERM CURRENT USE OF INSULIN: ICD-10-CM

## 2025-01-13 RX ORDER — BLOOD-GLUCOSE SENSOR
EACH MISCELLANEOUS
Qty: 6 EACH | Refills: 3 | Status: SHIPPED | OUTPATIENT
Start: 2025-01-13

## 2025-01-13 RX ORDER — BLOOD-GLUCOSE,RECEIVER,CONT
EACH MISCELLANEOUS
Qty: 1 EACH | Refills: 0 | Status: SHIPPED | OUTPATIENT
Start: 2025-01-13

## 2025-01-13 NOTE — PROGRESS NOTES
Clinical Pharmacy Appointment    Patient ID: Brittany Brown is a 69 y.o. female who presents for Diabetes.    Pt is here for Follow Up appointment.     Referring Provider: Valencia Keller MD  PCP: Valencia Keller MD   Last visit with PCP: 12/23/2024   Next visit with PCP: not scheduled      Subjective   HPI  DIABETES MELLITUS TYPE 2:    Current diabetic medications include:  Novolin N 100 units/mL; inject 70 units in the evenings    Clarifications to above regimen: None  Adverse Effects: None    Past diabetic medications include:  Metformin XR (side effects)    Glucose Readings:  Glucometer/CGM Type: Glucometer (patient has not been able to  the FreeStyle Pooja 3 sensors yet, patient will call pharmacy to when she can )  Patient tests BG 1 times per day    Current home BG readings (mg/dL): 180 mg/dL; 150 mg/dL; 89 mg/dL (Ave: 140 mg/dL)  Previous home BG readings (mg/dL): Mostly ranging  mg/dL; has been as high as 220 mg/dL    Any episodes of hypoglycemia? No, patient states that the lowest recently was 89 mg/dL .  Did patient treat episode of hypoglycemia appropriately? N/A  Does the patient have a prescription for ready-to-use Glucagon? No patient has not previously had any episodes of hypoglycemia  Does pt have proteinuria? Yes    Lifestyle:  Diet: 1-2 meals/day.   BK: Oatmeal  DN: hot dog, cheese and crackers  Snacks: Chips  Drinks: Water, diet coke  Physical Activity: Nothing at this time, pain in knees    Secondary Prevention:  Statin? Yes  ACE-I/ARB? Yes  Aspirin? Yes    Pertinent PMH Review:  PMH of Pancreatitis: No  PMH of Retinopathy: No  PMH of Urinary Tract Infections: No  PMH of MTC: No    Immunizations:  Influenza? Yes  COVID? Yes  Pneumonia? Yes  Shingles? Yes  Hepatitis B? Yes     Drug Interactions  The following drug interactions were noted: Norco, cyclobenzaprine and trazodone can all cause CNS depressant effects.    Medication System Management  Patient's preferred pharmacy:  "[id number] in [place]  Adherence/Organization: No concerns at this time      Objective   Allergies   Allergen Reactions    Adhesive Tape-Silicones Rash     SKIN PEELS OFF     Social History     Social History Narrative    Not on file      Medication Review  Current Outpatient Medications   Medication Instructions    aspirin 81 mg EC tablet 1 tablet, oral, Daily    blood sugar diagnostic (OneTouch Verio test strips) strip 2 times daily, test    blood-glucose sensor (FreeStyle Pooja 3 Plus Sensor) device Use to check blood sugars as directed. Change sensors every 15 days.    blood-glucose sensor (FreeStyle Pooja 3 Sensor) device Use as directed to check glucose    cyclobenzaprine (FLEXERIL) 10 mg, oral, 2 times daily PRN    FreeStyle Pooja 3 [place] misc Use as instructed to check blood sugars.    HYDROcodone-acetaminophen (Norco)  mg tablet 1 tablet, oral, Every 4 hours PRN    ibuprofen 800 mg, oral, 3 times daily PRN    lisinopril 20 mg, oral, Daily    mometasone (Nasonex) 50 mcg/actuation nasal spray 1 spray, Each Nostril, Daily    naloxone (NARCAN) 4 mg, nasal, As needed, May repeat every 2-3 minutes if needed, alternating nostrils, until medical assistance becomes available.    NovoLIN N FlexPen 70 Units, subcutaneous, Daily    pantoprazole (PROTONIX) 40 mg, oral, Daily    pen needle, diabetic 32 gauge x 3/16\" needle Daily, use as    potassium chloride CR 20 mEq ER tablet 20 mEq, oral, Daily    rosuvastatin (CRESTOR) 5 mg, oral, Daily    traZODone (DESYREL) 50 mg, oral, Nightly PRN      Vitals  BP Readings from Last 2 Encounters:   12/23/24 116/78   06/10/24 131/70     BMI Readings from Last 1 Encounters:   12/23/24 34.37 kg/m²      Labs  A1C  Lab Results   Component Value Date    HGBA1C 11.9 (A) 12/23/2024    HGBA1C 10.9 (A) 06/10/2024    HGBA1C 12.9 (A) 01/11/2024     BMP  Lab Results   Component Value Date    CALCIUM 9.6 06/14/2024     06/14/2024    K 3.3 (L) 06/14/2024    CO2 27 06/14/2024 "    CL 95 (L) 06/14/2024    BUN 13 06/14/2024    CREATININE 0.82 06/14/2024    EGFR 77 06/14/2024     LFTs  Lab Results   Component Value Date    ALT 23 10/13/2022    AST 21 10/13/2022    ALKPHOS 72 10/13/2022    BILITOT 0.7 10/13/2022     FLP  Lab Results   Component Value Date    TRIG 109 06/14/2024    CHOL 239 (H) 06/14/2024    LDLF 177 (H) 10/13/2022    LDLCALC 148 (H) 06/14/2024    HDL 69.3 06/14/2024     Urine Microalbumin  Lab Results   Component Value Date    MICROALBCREA 148.4 (H) 06/14/2024     Weight Management  Wt Readings from Last 3 Encounters:   12/23/24 79.8 kg (176 lb)   06/10/24 79.6 kg (175 lb 6.4 oz)   01/11/24 79.3 kg (174 lb 12.8 oz)      There is no height or weight on file to calculate BMI.     Assessment/Plan   Problem List Items Addressed This Visit       Type 2 diabetes mellitus with hyperglycemia, with long-term current use of insulin     Patient's goal A1c is < 7%.  Is pt at goal? No, patient's most recent A1c from 12/23/2024 was 11.9%  Patient's SMBGs are mostly elevated, patient states that she has a lot of habits that she needs to change to help with her diabetes control, such as the type of food and eating candy later in the evenings that can affect her blood sugars.     Rationale for plan: Patient still has not been able to  her FreeStyle Pooja 3 sensors. Patient will talk to pharmacy to see when she will be able to pick it up, will also send the 3 plus sensors if the problem is due to the Pooja 3 sensors being unavailable.    Medication Changes:  CONTINUE  Novolin N 100 units/mL; inject 70 units nightly  START  Once patient picks up CGM sensor, begin using to monitor blood sugar    Future Considerations:  Once patient can  the FreeStyle sensors, can share glucose data with [employer] site    Monitoring and Education:  Counseled patient on starting to make those lifestyle changes such as not eating late in the evenings and limiting the amount of sweets  Answered all  patient questions and concerns         Relevant Medications    FreeStyle Pooja 3 [place] misc    blood-glucose sensor (FreeStyle [place] 3 Plus Sensor) device    Other Relevant Orders    Referral to Clinical Pharmacy       Clinical Pharmacist follow-up: 1/27/2025, Telehealth visit    Continue all meds under the continuation of care with the referring provider and clinical pharmacy team.    Thank you,  Lary Hogan, PharmD  Clinical Pharmacist - Primary Care  [phone]  1/13/2025    Verbal consent to manage patient's drug therapy was obtained from the patient. They were informed they may decline to participate or withdraw from participation in pharmacy services at any time.

## 2025-01-13 NOTE — ASSESSMENT & PLAN NOTE
Patient's goal A1c is < 7%.  Is pt at goal? No, patient's most recent A1c from 12/23/2024 was 11.9%  Patient's SMBGs are mostly elevated, patient states that she has a lot of habits that she needs to change to help with her diabetes control, such as the type of food and eating candy later in the evenings that can affect her blood sugars.     Rationale for plan: Patient still has not been able to  her FreeStyle Pooja 3 sensors. Patient will talk to pharmacy to see when she will be able to pick it up, will also send the 3 plus sensors if the problem is due to the Pooja 3 sensors being unavailable.    Medication Changes:  CONTINUE  Novolin N 100 units/mL; inject 70 units nightly  START  Once patient picks up CGM sensor, begin using to monitor blood sugar    Future Considerations:  Once patient can  the FreeStyle sensors, can share glucose data with [employer] site    Monitoring and Education:  Counseled patient on starting to make those lifestyle changes such as not eating late in the evenings and limiting the amount of sweets  Answered all patient questions and concerns

## 2025-01-20 ENCOUNTER — APPOINTMENT (OUTPATIENT)
Dept: SURGERY | Facility: CLINIC | Age: 70
End: 2025-01-20
Payer: MEDICARE

## 2025-01-20 VITALS
WEIGHT: 178 LBS | SYSTOLIC BLOOD PRESSURE: 207 MMHG | HEART RATE: 102 BPM | DIASTOLIC BLOOD PRESSURE: 94 MMHG | TEMPERATURE: 98.1 F | BODY MASS INDEX: 34.76 KG/M2

## 2025-01-20 DIAGNOSIS — K43.9 VENTRAL HERNIA WITHOUT OBSTRUCTION OR GANGRENE: ICD-10-CM

## 2025-01-20 PROCEDURE — 1123F ACP DISCUSS/DSCN MKR DOCD: CPT | Performed by: SURGERY

## 2025-01-20 PROCEDURE — 4010F ACE/ARB THERAPY RXD/TAKEN: CPT | Performed by: SURGERY

## 2025-01-20 PROCEDURE — 1159F MED LIST DOCD IN RCRD: CPT | Performed by: SURGERY

## 2025-01-20 PROCEDURE — 3080F DIAST BP >= 90 MM HG: CPT | Performed by: SURGERY

## 2025-01-20 PROCEDURE — 99204 OFFICE O/P NEW MOD 45 MIN: CPT | Performed by: SURGERY

## 2025-01-20 PROCEDURE — 3077F SYST BP >= 140 MM HG: CPT | Performed by: SURGERY

## 2025-01-20 ASSESSMENT — ENCOUNTER SYMPTOMS
MUSCULOSKELETAL NEGATIVE: 1
GASTROINTESTINAL NEGATIVE: 1
CONSTITUTIONAL NEGATIVE: 1
RESPIRATORY NEGATIVE: 1
EYES NEGATIVE: 1

## 2025-01-20 NOTE — PROGRESS NOTES
Subjective   Patient ID: Brittany Brown is a 69 y.o. female who presents for evaluation of a ventral hernia..  Patient is a 69-year-old female who presents with an enlarging and increasing symptomatic recurrent ventral hernia.  She has a surgical history of a hysterectomy as well as a prior ventral hernia repair 13 years ago with onlay mesh.  She has no recent imaging.  She denies any alteration in urinary or bowel habits.  She states she had a colonoscopy within the last 10 years.  Her only other recent medical issues are an elevated A1c of 11.  She states that she was able to get her A1c below 7 for a recent knee surgery.  She does not have an endocrinologist which she sees regularly.        Review of Systems   Constitutional: Negative.    HENT: Negative.     Eyes: Negative.    Respiratory: Negative.     Gastrointestinal: Negative.    Genitourinary: Negative.    Musculoskeletal: Negative.    Skin: Negative.        Objective   Physical Exam  Constitutional:       General: She is not in acute distress.     Appearance: Normal appearance. She is obese. She is not ill-appearing.   Eyes:      General: No scleral icterus.  Cardiovascular:      Pulses: Normal pulses.   Pulmonary:      Effort: Pulmonary effort is normal.   Abdominal:      General: There is no distension.      Palpations: Abdomen is soft.      Tenderness: There is no abdominal tenderness. There is no guarding.      Hernia: A hernia is present.   Musculoskeletal:         General: Normal range of motion.   Skin:     General: Skin is warm.      Coloration: Skin is not jaundiced.       Patient appears in no apparent distress.  Abdomen is mildly obese but soft and nondistended.  She has a full midline incision.  There is a 4 x 6 cm subcutaneous fullness which is partially reducible.  There is no overlying erythema, ecchymosis, induration, or drainage.    Assessment/Plan     Patient has a recurrent ventral hernia.  With a lengthy discussion regarding the risks,  benefits, tenderness, complications of laparoscopic ventral hernia repair with risk explained including bleeding, infection, viscus injury, conversion, staged procedure, hybrid procedure, seroma formation, hernia recurrence, and possible need for subsequent surgical intervention.  It was also explained that she would need preoperative imaging as well as evaluation by endocrinology and that her A1c needs to be lowered before undergoing elective surgery.  Signs and symptoms of strangulation were explained and she understands these as markers to seek emergent medical care.  She will be scheduled electively for laparoscopic ventral hernia repair after being seen by endocrinology and resolution of her elevated A1c.         Rod Bosch MD 01/20/25 4:30 PM

## 2025-01-27 ENCOUNTER — TELEPHONE (OUTPATIENT)
Dept: PRIMARY CARE | Facility: CLINIC | Age: 70
End: 2025-01-27

## 2025-01-27 ENCOUNTER — APPOINTMENT (OUTPATIENT)
Dept: PHARMACY | Facility: HOSPITAL | Age: 70
End: 2025-01-27
Payer: MEDICARE

## 2025-01-27 DIAGNOSIS — Z79.4 TYPE 2 DIABETES MELLITUS WITH HYPERGLYCEMIA, WITH LONG-TERM CURRENT USE OF INSULIN: ICD-10-CM

## 2025-01-27 DIAGNOSIS — E11.65 TYPE 2 DIABETES MELLITUS WITH HYPERGLYCEMIA, WITH LONG-TERM CURRENT USE OF INSULIN: ICD-10-CM

## 2025-01-27 NOTE — PROGRESS NOTES
Clinical Pharmacy Appointment    Patient ID: Brittany Brown is a 69 y.o. female who presents for Diabetes.    Pt is here for Follow Up appointment.     Referring Provider: Valencia Keller MD  PCP: Valencia Keller MD   Last visit with PCP: 2024   Next visit with PCP: not scheduled      Subjective   HPI  DIABETES MELLITUS TYPE 2:    Current diabetic medications include:  Novolin N 100 units/mL; inject 70 units in the evenings    Clarifications to above regimen: None  Adverse Effects: None    Past diabetic medications include:  Metformin XR (side effects)    Glucose Readings:  Glucometer/CGM Type: Glucometer, waiting on Medicare authorization to  FreeStyle sensors  Patient tests BG 1 times per day    Current home BG readings (mg/dL): FB-90 mg/dL; highest PPB mg/dL  Previous home BG readings (mg/dL):  mg/dL (Ave: 140 mg/dL)    Any episodes of hypoglycemia? No, patient has not been having any episodes with severe hypoglycemia .  Did patient treat episode of hypoglycemia appropriately? N/A  Does the patient have a prescription for ready-to-use Glucagon? No has not been experiencing hypoglycemia requiring glucagon.    Lifestyle:  Diet: 1-2 meals/day.  BK: Oatmeal  DN: Hot dog, cheese and crackers  Snacks: Chips  Drinks: Water, diet coke  Physical Activity: Nothing at this time, pain in knees  Tobacco history: non-smoker    Secondary Prevention:  Statin? Yes  ACE-I/ARB? Yes  Aspirin? Yes    Pertinent PMH Review:  PMH of Pancreatitis: No  PMH of Retinopathy: No  PMH of Urinary Tract Infections: No  PMH of MTC: No  UACR/EGFR in last year?: Yes  Albumin/Creatinine Ratio   Date Value Ref Range Status   2024 148.4 (H) <30.0 ug/mg Creat Final     Albumin/Creatine Ratio   Date Value Ref Range Status   2020 30.0 0.0 - 30.0 ug/mg crt Final     Immunizations:  Influenza? Yes  COVID? No  Pneumonia? Yes  Shingles? Yes  Hepatitis B? Yes     Drug Interactions  The following drug interactions  "were noted: Norco, cyclobenzaprine, and trazodone can all cause additive CNS depressant effects.    Medication System Management  Patient's preferred pharmacy: [id number] in [place]  Adherence/Organization: No concerns at this time      Objective   Allergies   Allergen Reactions    Adhesive Tape-Silicones Rash     SKIN PEELS OFF     Social History     Social History Narrative    Not on file      Medication Review  Current Outpatient Medications   Medication Instructions    aspirin 81 mg EC tablet 1 tablet, oral, Daily    blood sugar diagnostic (OneTouch Verio test strips) strip 2 times daily, test    blood-glucose sensor (FreeStyle Pooja 3 Plus Sensor) device Use to check blood sugars as directed. Change sensors every 15 days.    blood-glucose sensor (FreeStyle Pooja 3 Sensor) device Use as directed to check glucose    cyclobenzaprine (FLEXERIL) 10 mg, oral, 2 times daily PRN    FreeStyle Pooja 3 [place] misc Use as instructed to check blood sugars.    HYDROcodone-acetaminophen (Norco)  mg tablet 1 tablet, oral, Every 4 hours PRN    ibuprofen 800 mg, oral, 3 times daily PRN    lisinopril 20 mg, oral, Daily    mometasone (Nasonex) 50 mcg/actuation nasal spray 1 spray, Each Nostril, Daily    naloxone (NARCAN) 4 mg, nasal, As needed, May repeat every 2-3 minutes if needed, alternating nostrils, until medical assistance becomes available.    NovoLIN N FlexPen 70 Units, subcutaneous, Daily    pantoprazole (PROTONIX) 40 mg, oral, Daily    pen needle, diabetic 32 gauge x 3/16\" needle Daily, use as    potassium chloride CR 20 mEq ER tablet 20 mEq, oral, Daily    rosuvastatin (CRESTOR) 5 mg, oral, Daily    traZODone (DESYREL) 50 mg, oral, Nightly PRN      Vitals  BP Readings from Last 2 Encounters:   01/20/25 (!) 207/94   12/23/24 116/78     BMI Readings from Last 1 Encounters:   01/20/25 34.76 kg/m²      Labs  A1C  Lab Results   Component Value Date    HGBA1C 11.9 (A) 12/23/2024    HGBA1C 10.9 (A) 06/10/2024    " HGBA1C 12.9 (A) 2024     BMP  Lab Results   Component Value Date    CALCIUM 9.6 2024     2024    K 3.3 (L) 2024    CO2 27 2024    CL 95 (L) 2024    BUN 13 2024    CREATININE 0.82 2024    EGFR 77 2024     LFTs  Lab Results   Component Value Date    ALT 23 10/13/2022    AST 21 10/13/2022    ALKPHOS 72 10/13/2022    BILITOT 0.7 10/13/2022     FLP  Lab Results   Component Value Date    TRIG 109 2024    CHOL 239 (H) 2024    LDLF 177 (H) 10/13/2022    LDLCALC 148 (H) 2024    HDL 69.3 2024     Urine Microalbumin  Lab Results   Component Value Date    MICROALBCREA 148.4 (H) 2024     Weight Management  Wt Readings from Last 3 Encounters:   25 80.7 kg (178 lb)   24 79.8 kg (176 lb)   06/10/24 79.6 kg (175 lb 6.4 oz)      There is no height or weight on file to calculate BMI.     Assessment/Plan   Problem List Items Addressed This Visit       Type 2 diabetes mellitus with hyperglycemia, with long-term current use of insulin     Patient's goal A1c is < 7%.  Is pt at goal? No, patient's most recent A1c from 2024 was 11.9%  Patient's SMBGs are variable, patient typically has FBG 79-90 mg/dL, but the highest PPBG that she has had recently was 214 mg/dL   Rationale for plan: Patient is still waiting for the pharmacy to get Medicare authorization for her FreeStyle sensors.    Medication Changes:  CONTINUE  Novolin N 100 units/mL; inject 70 units in the evenings    Future Considerations:  Once patient picks up FreeStyle sensors, can share glucose data through [employer] site    Monitoring and Education:  Counseled patient on the goals such as FB-130 mg/dL; PPBG: <180 mg/dL  Answered all patient questions and concerns         Relevant Orders    Referral to Clinical Pharmacy       Clinical Pharmacist follow-up: 2024, Telehealth visit    Continue all meds under the continuation of care with the referring provider and  clinical pharmacy team.    Thank you,  Lary Hogan, PharmD  Clinical Pharmacist - Primary Care  [phone]  1/27/2025    Verbal consent to manage patient's drug therapy was obtained from the patient. They were informed they may decline to participate or withdraw from participation in pharmacy services at any time.

## 2025-01-27 NOTE — ASSESSMENT & PLAN NOTE
Patient's goal A1c is < 7%.  Is pt at goal? No, patient's most recent A1c from 2024 was 11.9%  Patient's SMBGs are variable, patient typically has FBG 79-90 mg/dL, but the highest PPBG that she has had recently was 214 mg/dL   Rationale for plan: Patient is still waiting for the pharmacy to get Medicare authorization for her FreeStyle sensors.    Medication Changes:  CONTINUE  Novolin N 100 units/mL; inject 70 units in the evenings    Future Considerations:  Once patient picks up FreeStyle sensors, can share glucose data through [employer] site    Monitoring and Education:  Counseled patient on the goals such as FB-130 mg/dL; PPBG: <180 mg/dL  Answered all patient questions and concerns

## 2025-01-27 NOTE — TELEPHONE ENCOUNTER
----- Message from Valencia Keller sent at 1/25/2025  7:13 AM EST -----  Regarding: RE: Freestyle Pooja  I've got it.  Will be faxed Monday.  ----- Message -----  From: Liseth Gibson  Sent: 1/24/2025   3:01 PM EST  To: Valencia Keller MD  Subject: Lilian Salgado from [employer] called on behalf of pt wondering if we received a physician order request for her freestyle pooja and to just make sure we fax it back to [phone]. Have you seen this?

## 2025-02-03 ENCOUNTER — HOSPITAL ENCOUNTER (OUTPATIENT)
Dept: RADIOLOGY | Facility: CLINIC | Age: 70
Discharge: HOME | End: 2025-02-03
Payer: MEDICARE

## 2025-02-03 DIAGNOSIS — K43.9 VENTRAL HERNIA WITHOUT OBSTRUCTION OR GANGRENE: ICD-10-CM

## 2025-02-03 PROCEDURE — 74176 CT ABD & PELVIS W/O CONTRAST: CPT

## 2025-02-03 PROCEDURE — 74176 CT ABD & PELVIS W/O CONTRAST: CPT | Performed by: RADIOLOGY

## 2025-02-17 ENCOUNTER — APPOINTMENT (OUTPATIENT)
Dept: PHARMACY | Facility: HOSPITAL | Age: 70
End: 2025-02-17
Payer: MEDICARE

## 2025-02-17 DIAGNOSIS — Z79.4 TYPE 2 DIABETES MELLITUS WITH HYPERGLYCEMIA, WITH LONG-TERM CURRENT USE OF INSULIN: ICD-10-CM

## 2025-02-17 DIAGNOSIS — E11.65 TYPE 2 DIABETES MELLITUS WITH HYPERGLYCEMIA, WITH LONG-TERM CURRENT USE OF INSULIN: ICD-10-CM

## 2025-02-17 NOTE — ASSESSMENT & PLAN NOTE
Patient's goal A1c is < 7%.  Is pt at goal? No, patient's most recent A1c from 12/23/2024 was 11.9%  Patient's SMBGs are at goal.     Rationale for plan: Patient will start using the FreeStyle Pooja 3 plus sensors. Patient is also interested in starting Ozempic to help further control her blood sugars and decrease insulin requirement.    Ozempic Education:     - Counseled patient on Ozempic MOA, expectations, side effects, duration of therapy, administration, and monitoring parameters.  - Provided detailed dosing and administration counseling to ensure proper technique.   - Reviewed Ozempic titration schedule, starting with 0.25 mg once weekly for 4 weeks, then continuing on 0.5 mg once weekly. Pt verbalized understanding.  - Counseled patient on the benefits of GLP-1ra, such as cardiovascular risk reduction, glycemic control, and weight loss potential.  - Reviewed storage requirements of Ozempic when not in use, and when to administer the medication if a dose is missed.  - Advised patient that they may experience improved satiety after meals and portion sizes of meals may be reduced as doses of Ozempic increase.  - Counseled patient to avoid foods that are fatty/oily as this may precipitate the nausea/GI upset that may occur with new start Ozempic.     Medication Changes:  CONTINUE  Novolin N 100 units/mL; inject 60 units in the evenings  START  Ozempic 0.25 mg or 0.5 mg/dose; inject 0.25 mg once weekly for 4 weeks, then increase to 0.5 mg.    Future Considerations:  Can share blood sugar data through [employer] site once patient sets up Pooja    Monitoring and Education:  Counseled on the medications including dose, administration, storage  Counseled patient on the FreeStyle Pooja sensors

## 2025-02-17 NOTE — PROGRESS NOTES
Clinical Pharmacy Appointment    Patient ID: Brittany Brown is a 69 y.o. female who presents for Diabetes.    Pt is here for Follow Up appointment.     Referring Provider: Valencia Keller MD  PCP: Valencia Keller MD   Last visit with PCP: 2024   Next visit with PCP: not scheduled    Subjective   HPI  DIABETES MELLITUS TYPE 2:    Current diabetic medications include:  Novolin N 100 units/mL; inject 60 units in the evenings    Clarifications to above regimen: patient decreased to 60 units  Adverse Effects: None    Past diabetic medications include:  Metformin XR (side effects    Glucose Readings:  Glucometer/CGM Type: Glucometer, patient picked up FreeStyle Pooja 3 sensors, will start to use them tonight  Patient tests BG 1 times per day    Current home BG readings (mg/dL): FB-120 mg/dL  Previous home BG readings (mg/dL): FB-90 mg/dL; highest PPB mg/dL    Any episodes of hypoglycemia? No, lowest was 84 mg/dL .  Did patient treat episode of hypoglycemia appropriately? N/A  Does the patient have a prescription for ready-to-use Glucagon? No patient not experiencing hypoglycemia    Lifestyle:  Diet: 1-2 meals/day.   BK: Oatmeal  DN: Hot dog, cheese and crackers  Snacks: Chips  Drinks: Water, diet coke  Exercise: does not exercise  Activity type: Type of Exercise : patient has a hard time walking with knee pain  Tobacco history: non-smoker    Secondary Prevention:  Statin? Yes  ACE-I/ARB? Yes  Aspirin? Yes    Pertinent PMH Review:  PMH of Pancreatitis: No  PMH of Retinopathy: No  PMH of Urinary Tract Infections: No  PMH of MTC: No  PMH of MEN2: No  UACR/EGFR in last year?: Yes  Albumin/Creatinine Ratio   Date Value Ref Range Status   2024 148.4 (H) <30.0 ug/mg Creat Final     Albumin/Creatine Ratio   Date Value Ref Range Status   2020 30.0 0.0 - 30.0 ug/mg crt Final     Immunizations:  Influenza? Yes  COVID? No  Pneumonia? Yes  Shingles? Yes  Hepatitis B? Yes    Drug Interactions  The  "following drug interactions were noted: Norco, cyclobenzaprine, and trazodone can all cause additive CNS depressant effects.    Medication System Management  Patient's preferred pharmacy: [id number] in [place]  Adherence/Organization: No concerns at this time  Affordability/Accessibility: Will screen patient for PAP for Ozempic.      Objective   Allergies   Allergen Reactions    Adhesive Tape-Silicones Rash     SKIN PEELS OFF     Social History     Social History Narrative    Not on file      Medication Review  Current Outpatient Medications   Medication Instructions    aspirin 81 mg EC tablet 1 tablet, oral, Daily    blood sugar diagnostic (OneTouch Verio test strips) strip 2 times daily, test    blood-glucose sensor (FreeStyle Pooja 3 Plus Sensor) device Use to check blood sugars as directed. Change sensors every 15 days.    blood-glucose sensor (FreeStyle Pooja 3 Sensor) device Use as directed to check glucose    cyclobenzaprine (FLEXERIL) 10 mg, oral, 2 times daily PRN    FreeStyle Pooja 3 [place] misc Use as instructed to check blood sugars.    HYDROcodone-acetaminophen (Norco)  mg tablet 1 tablet, oral, Every 4 hours PRN    ibuprofen 800 mg, oral, 3 times daily PRN    lisinopril 20 mg, oral, Daily    mometasone (Nasonex) 50 mcg/actuation nasal spray 1 spray, Each Nostril, Daily    naloxone (NARCAN) 4 mg, nasal, As needed, May repeat every 2-3 minutes if needed, alternating nostrils, until medical assistance becomes available.    NovoLIN N FlexPen 70 Units, subcutaneous, Daily    pantoprazole (PROTONIX) 40 mg, oral, Daily    pen needle, diabetic 32 gauge x 3/16\" needle Daily, use as    potassium chloride CR 20 mEq ER tablet 20 mEq, oral, Daily    rosuvastatin (CRESTOR) 5 mg, oral, Daily    traZODone (DESYREL) 50 mg, oral, Nightly PRN      Vitals  BP Readings from Last 2 Encounters:   01/20/25 (!) 207/94   12/23/24 116/78     BMI Readings from Last 1 Encounters:   01/20/25 34.76 kg/m²    "   Labs  A1C  Lab Results   Component Value Date    HGBA1C 11.9 (A) 12/23/2024    HGBA1C 10.9 (A) 06/10/2024    HGBA1C 12.9 (A) 01/11/2024     BMP  Lab Results   Component Value Date    CALCIUM 9.6 06/14/2024     06/14/2024    K 3.3 (L) 06/14/2024    CO2 27 06/14/2024    CL 95 (L) 06/14/2024    BUN 13 06/14/2024    CREATININE 0.82 06/14/2024    EGFR 77 06/14/2024     LFTs  Lab Results   Component Value Date    ALT 23 10/13/2022    AST 21 10/13/2022    ALKPHOS 72 10/13/2022    BILITOT 0.7 10/13/2022     FLP  Lab Results   Component Value Date    TRIG 109 06/14/2024    CHOL 239 (H) 06/14/2024    LDLF 177 (H) 10/13/2022    LDLCALC 148 (H) 06/14/2024    HDL 69.3 06/14/2024     Urine Microalbumin  Lab Results   Component Value Date    MICROALBCREA 148.4 (H) 06/14/2024     Weight Management  Wt Readings from Last 3 Encounters:   01/20/25 80.7 kg (178 lb)   12/23/24 79.8 kg (176 lb)   06/10/24 79.6 kg (175 lb 6.4 oz)      There is no height or weight on file to calculate BMI.     Assessment/Plan   Problem List Items Addressed This Visit       Type 2 diabetes mellitus with hyperglycemia, with long-term current use of insulin     Patient's goal A1c is < 7%.  Is pt at goal? No, patient's most recent A1c from 12/23/2024 was 11.9%  Patient's SMBGs are at goal.     Rationale for plan: Patient will start using the FreeStyle Pooja 3 plus sensors. Patient is also interested in starting Ozempic to help further control her blood sugars and decrease insulin requirement.    Ozempic Education:     - Counseled patient on Ozempic MOA, expectations, side effects, duration of therapy, administration, and monitoring parameters.  - Provided detailed dosing and administration counseling to ensure proper technique.   - Reviewed Ozempic titration schedule, starting with 0.25 mg once weekly for 4 weeks, then continuing on 0.5 mg once weekly. Pt verbalized understanding.  - Counseled patient on the benefits of GLP-1ra, such as cardiovascular  risk reduction, glycemic control, and weight loss potential.  - Reviewed storage requirements of Ozempic when not in use, and when to administer the medication if a dose is missed.  - Advised patient that they may experience improved satiety after meals and portion sizes of meals may be reduced as doses of Ozempic increase.  - Counseled patient to avoid foods that are fatty/oily as this may precipitate the nausea/GI upset that may occur with new start Ozempic.     Medication Changes:  CONTINUE  Novolin N 100 units/mL; inject 60 units in the evenings  START  Ozempic 0.25 mg or 0.5 mg/dose; inject 0.25 mg once weekly for 4 weeks, then increase to 0.5 mg.    Future Considerations:  Can share blood sugar data through [employer] site once patient sets up Pooja    Monitoring and Education:  Counseled on the medications including dose, administration, storage  Counseled patient on the FreeStyle Pooja sensors            Clinical Pharmacist follow-up: 3/3/2025, Telehealth visit    Patient is not followed in [facility]. (If yes, track minutes under compass zachary at each visit)    Continue all meds under the continuation of care with the referring provider and clinical pharmacy team.    Thank you,  Lary Hogan, PharmD  Clinical Pharmacist - Primary Care  [phone]  2/17/2025    Verbal consent to manage patient's drug therapy was obtained from the patient. They were informed they may decline to participate or withdraw from participation in pharmacy services at any time.

## 2025-03-03 ENCOUNTER — APPOINTMENT (OUTPATIENT)
Dept: PHARMACY | Facility: HOSPITAL | Age: 70
End: 2025-03-03
Payer: MEDICARE

## 2025-03-03 DIAGNOSIS — E11.65 TYPE 2 DIABETES MELLITUS WITH HYPERGLYCEMIA, WITH LONG-TERM CURRENT USE OF INSULIN: ICD-10-CM

## 2025-03-03 DIAGNOSIS — Z79.4 TYPE 2 DIABETES MELLITUS WITH HYPERGLYCEMIA, WITH LONG-TERM CURRENT USE OF INSULIN: ICD-10-CM

## 2025-03-03 NOTE — ASSESSMENT & PLAN NOTE
Patient's goal A1c is < 7%.  Is pt at goal? No, patient's most recent A1c from 12/23/2024 was 11.9%  Patient's SMBGs are well-controlled. Patient's FreeStyle Pooja fell off early so she has just been using her traditional glucometer. Counseled patient that if she experiences more problems with the sensors falling off early, to call the  to get a replacement.     Rationale for plan: Patient at this time believes that her and her 's income would be too high for the  PAP program.    Medication Changes:  CONTINUE  Novolin N 100 units/mL; inject 60 units in the evenings    Future Considerations:  If patient's financial situation changes, patient can send income documents for  PAP    Monitoring and Education:  Counseled patient on medications  Answered all patient questions and concerns

## 2025-03-03 NOTE — PROGRESS NOTES
Clinical Pharmacy Appointment    Patient ID: Brittany Brown is a 69 y.o. female who presents for Diabetes.    Pt is here for Follow Up appointment.     Referring Provider: Valencia Keller MD  PCP: Valencia Keller MD   Last visit with PCP: 2024   Next visit with PCP: not scheduled    Subjective   HPI  DIABETES MELLITUS TYPE 2:    Does patient follow with Endocrinology: No  Last optometry exam: early in , patient will make appointment for this year     Current diabetic medications include:  Novolin N 100 units/mL; inject 60 units in the evenings    Clarifications to above regimen: None  Adverse Effects: None    Glucose Readings:  Glucometer/CGM Type: FreeStyle Pooja 3 and Traditional Glucometer    Current home BG readings (mg/dL): FB-105 mg/dL   Previous home BG readings (mg/dL): FB-120 mg/dL    Any episodes of hypoglycemia? No, lowest was 89 mg/dL .  Did patient treat episode of hypoglycemia appropriately? N/A  Does the patient have a prescription for ready-to-use Glucagon? No patient is not experiencing hypoglycemia    Lifestyle:  Diet: 1-2 meals/day. Not eating past 6:30 pm  BK: Oatmeal  DN: Hot dogs, cheese and crackers  Snacks: Chips  Drinks: Water, diet coke  Exercise: does not exercise  Is limited by:  knee pain  Tobacco history: non-smoker    Secondary Prevention:  Statin? Yes  ACE-I/ARB? Yes  Aspirin? Yes    Pertinent PMH Review:  PMH of Pancreatitis: No  PMH of Retinopathy: No  PMH of Urinary Tract Infections: No  PMH of MTC: No  PMH of MEN2: No  UACR/EGFR in last year?: Yes  Albumin/Creatinine Ratio   Date Value Ref Range Status   2024 148.4 (H) <30.0 ug/mg Creat Final     Albumin/Creatine Ratio   Date Value Ref Range Status   2020 30.0 0.0 - 30.0 ug/mg crt Final     Immunizations:  Influenza? Yes  COVID? No  Pneumonia? Yes  Shingles? Yes  Hepatitis B? Yes     Drug Interactions  The following drug interactions were noted: Norco, cyclobenzaprine, and trazodone can all cause  "additive CNS depressant effects.    Medication System Management  Patient's preferred pharmacy: [id number] in [place]  Adherence/Organization: No concerns at this time      Objective   Allergies   Allergen Reactions    Adhesive Tape-Silicones Rash     SKIN PEELS OFF     Social History     Social History Narrative    Not on file      Medication Review  Current Outpatient Medications   Medication Instructions    aspirin 81 mg EC tablet 1 tablet, oral, Daily    blood sugar diagnostic (OneTouch Verio test strips) strip 2 times daily, test    blood-glucose sensor (FreeStyle Pooja 3 Plus Sensor) device Use to check blood sugars as directed. Change sensors every 15 days.    blood-glucose sensor (FreeStyle Pooja 3 Sensor) device Use as directed to check glucose    cyclobenzaprine (FLEXERIL) 10 mg, oral, 2 times daily PRN    FreeStyle Pooja 3 [place] misc Use as instructed to check blood sugars.    HYDROcodone-acetaminophen (Norco)  mg tablet 1 tablet, oral, Every 4 hours PRN    ibuprofen 800 mg, oral, 3 times daily PRN    lisinopril 20 mg, oral, Daily    mometasone (Nasonex) 50 mcg/actuation nasal spray 1 spray, Each Nostril, Daily    naloxone (NARCAN) 4 mg, nasal, As needed, May repeat every 2-3 minutes if needed, alternating nostrils, until medical assistance becomes available.    NovoLIN N FlexPen 70 Units, subcutaneous, Daily    pantoprazole (PROTONIX) 40 mg, oral, Daily    pen needle, diabetic 32 gauge x 3/16\" needle Daily, use as    potassium chloride CR 20 mEq ER tablet 20 mEq, oral, Daily    rosuvastatin (CRESTOR) 5 mg, oral, Daily    traZODone (DESYREL) 50 mg, oral, Nightly PRN      Vitals  BP Readings from Last 2 Encounters:   01/20/25 (!) 207/94   12/23/24 116/78     BMI Readings from Last 1 Encounters:   01/20/25 34.76 kg/m²      Labs  A1C  Lab Results   Component Value Date    HGBA1C 11.9 (A) 12/23/2024    HGBA1C 10.9 (A) 06/10/2024    HGBA1C 12.9 (A) 01/11/2024     BMP  Lab Results   Component " Value Date    CALCIUM 9.6 06/14/2024     06/14/2024    K 3.3 (L) 06/14/2024    CO2 27 06/14/2024    CL 95 (L) 06/14/2024    BUN 13 06/14/2024    CREATININE 0.82 06/14/2024    EGFR 77 06/14/2024     LFTs  Lab Results   Component Value Date    ALT 23 10/13/2022    AST 21 10/13/2022    ALKPHOS 72 10/13/2022    BILITOT 0.7 10/13/2022     FLP  Lab Results   Component Value Date    TRIG 109 06/14/2024    CHOL 239 (H) 06/14/2024    LDLF 177 (H) 10/13/2022    LDLCALC 148 (H) 06/14/2024    HDL 69.3 06/14/2024     Urine Microalbumin  Lab Results   Component Value Date    MICROALBCREA 148.4 (H) 06/14/2024     Weight Management  Wt Readings from Last 3 Encounters:   01/20/25 80.7 kg (178 lb)   12/23/24 79.8 kg (176 lb)   06/10/24 79.6 kg (175 lb 6.4 oz)      There is no height or weight on file to calculate BMI.     Assessment/Plan   Problem List Items Addressed This Visit       Type 2 diabetes mellitus with hyperglycemia, with long-term current use of insulin     Patient's goal A1c is < 7%.  Is pt at goal? No, patient's most recent A1c from 12/23/2024 was 11.9%  Patient's SMBGs are well-controlled. Patient's FreeStyle Pooja fell off early so she has just been using her traditional glucometer. Counseled patient that if she experiences more problems with the sensors falling off early, to call the  to get a replacement.     Rationale for plan: Patient at this time believes that her and her 's income would be too high for the  PAP program.    Medication Changes:  CONTINUE  Novolin N 100 units/mL; inject 60 units in the evenings    Future Considerations:  If patient's financial situation changes, patient can send income documents for  PAP    Monitoring and Education:  Counseled patient on medications  Answered all patient questions and concerns         Relevant Orders    Referral to Clinical Pharmacy       Clinical Pharmacist follow-up: 4/7/2025, Telehealth visit    Patient is not followed in [facility]. (If  yes, track minutes under compass zachary at each visit)    Continue all meds under the continuation of care with the referring provider and clinical pharmacy team.    Thank you,  Lary Hogan, PharmD  Clinical Pharmacist - [facility]  [phone]  3/3/2025    Verbal consent to manage patient's drug therapy was obtained from the patient. They were informed they may decline to participate or withdraw from participation in pharmacy services at any time.

## 2025-04-07 ENCOUNTER — APPOINTMENT (OUTPATIENT)
Dept: PHARMACY | Facility: HOSPITAL | Age: 70
End: 2025-04-07
Payer: MEDICARE

## 2025-04-07 DIAGNOSIS — Z79.4 TYPE 2 DIABETES MELLITUS WITH HYPERGLYCEMIA, WITH LONG-TERM CURRENT USE OF INSULIN: ICD-10-CM

## 2025-04-07 DIAGNOSIS — E11.65 TYPE 2 DIABETES MELLITUS WITH HYPERGLYCEMIA, WITH LONG-TERM CURRENT USE OF INSULIN: ICD-10-CM

## 2025-04-07 NOTE — ASSESSMENT & PLAN NOTE
Patient's goal A1c is < 7%.  Is pt at goal? No, patient's most recent A1c from 12/23/2024 was 11.9%  Patient's SMBGs are variable, patient's blood sugars in the morning are at goal, typically <100 mg/dL, but later in the day, patient's blood sugar spikes to around 350 mg/dL.     Rationale for plan: Patient states that her income is too high to qualify for  PAP, therefore, she wants to look into other medications that are more cost effective. Will look into the copay options for other medications and will talk with patient more in depth next week.    Medication Changes:  CONTINUE  Novolin N 100 units/mL; inject 70 units in the evenings    Future Considerations:  When patient starts another medication, patient may need to decrease insulin back to 60 units, or even lower to ensure she does not have hypoglycemia.    Monitoring and Education:  Answered all patient questions and concerns

## 2025-04-07 NOTE — PROGRESS NOTES
Clinical Pharmacy Appointment    Patient ID: Brittany Brown is a 69 y.o. female who presents for Diabetes.    Pt is here for Follow Up appointment.     Referring Provider: Valencia Keller MD  PCP: Valencia Keller MD   Last visit with PCP: 2024   Next visit with PCP: not scheduled    Subjective   Drug Interactions  The following drug interactions were noted: Norco, cyclobenzaprine,and trazodone can all have additive CNS depressant effects.    Medication System Management  Patient's preferred pharmacy: [id number] in [place]  Adherence/Organization: No concerns at this time  Affordability/Accessibility: per patient income too high for PAP      HPI  DIABETES MELLITUS TYPE 2:    Does patient follow with Endocrinology: No     Current diabetic medications include:  Novolin N 100 units/mL; inject 70 units in the evenings    Clarifications to above regimen: None  Adverse Effects: None    Past diabetic medications include:  Metformin XR (side effects)    Glucose Readings:  Glucometer/CGM Type: FreeStyle Pooja 2 and traditional glucometer    Current home BG readings (mg/dL): FB-98 mg/dL PPBG: highest 350 mg/dL  Previous home BG readings (mg/dL):  mg/dL    Any episodes of hypoglycemia? No, typically in the 80s in the morning .  Did patient treat episode of hypoglycemia appropriately? N/A  Does the patient have a prescription for ready-to-use Glucagon? No patient does not have hypoglycemia    Lifestyle:  Diet: 1-2 meals/day. Not eating past 6:30 pm  BK: Oatmeal  DN: Hot dogs, cheese and crackers  Snacks: Chips  Drinks: Water, diet coke  Exercise: does not exercise  Tobacco history: non-smoker    Secondary Prevention:  Statin? Yes  ACE-I/ARB? Yes  Aspirin? Yes    Pertinent PMH Review:  PMH of Pancreatitis: No  PMH of Retinopathy: No  PMH of Urinary Tract Infections: No  PMH of MTC: No  PMH of MEN2: No  UACR/EGFR in last year?: Yes  Albumin/Creatinine Ratio   Date Value Ref Range Status   2024  "148.4 (H) <30.0 ug/mg Creat Final     Albumin/Creatine Ratio   Date Value Ref Range Status   11/30/2020 30.0 0.0 - 30.0 ug/mg crt Final     Immunizations:  Influenza? Yes  COVID? No  Pneumonia? Yes  Shingles? Yes  Hepatitis B? Yes       Objective   Allergies   Allergen Reactions    Adhesive Tape-Silicones Rash     SKIN PEELS OFF     Social History     Social History Narrative    Not on file      Medication Review  Current Outpatient Medications   Medication Instructions    aspirin 81 mg EC tablet 1 tablet, oral, Daily    blood sugar diagnostic (OneTouch Verio test strips) strip 2 times daily, test    blood-glucose sensor (FreeStyle Pooja 3 Plus Sensor) device Use to check blood sugars as directed. Change sensors every 15 days.    blood-glucose sensor (FreeStyle Pooja 3 Sensor) device Use as directed to check glucose    cyclobenzaprine (FLEXERIL) 10 mg, oral, 2 times daily PRN    FreeStyle Pooja 3 [place] misc Use as instructed to check blood sugars.    HYDROcodone-acetaminophen (Norco)  mg tablet 1 tablet, oral, Every 4 hours PRN    ibuprofen 800 mg, oral, 3 times daily PRN    lisinopril 20 mg, oral, Daily    mometasone (Nasonex) 50 mcg/actuation nasal spray 1 spray, Each Nostril, Daily    naloxone (NARCAN) 4 mg, nasal, As needed, May repeat every 2-3 minutes if needed, alternating nostrils, until medical assistance becomes available.    NovoLIN N FlexPen 70 Units, subcutaneous, Daily    pantoprazole (PROTONIX) 40 mg, oral, Daily    pen needle, diabetic 32 gauge x 3/16\" needle Daily, use as    potassium chloride CR 20 mEq ER tablet 20 mEq, oral, Daily    rosuvastatin (CRESTOR) 5 mg, oral, Daily    traZODone (DESYREL) 50 mg, oral, Nightly PRN      Vitals  BP Readings from Last 2 Encounters:   01/20/25 (!) 207/94   12/23/24 116/78     BMI Readings from Last 1 Encounters:   01/20/25 34.76 kg/m²      Labs  A1C  Lab Results   Component Value Date    HGBA1C 11.9 (A) 12/23/2024    HGBA1C 10.9 (A) 06/10/2024    HGBA1C " 12.9 (A) 01/11/2024     BMP  Lab Results   Component Value Date    CALCIUM 9.6 06/14/2024     06/14/2024    K 3.3 (L) 06/14/2024    CO2 27 06/14/2024    CL 95 (L) 06/14/2024    BUN 13 06/14/2024    CREATININE 0.82 06/14/2024    EGFR 77 06/14/2024     LFTs  Lab Results   Component Value Date    ALT 23 10/13/2022    AST 21 10/13/2022    ALKPHOS 72 10/13/2022    BILITOT 0.7 10/13/2022     FLP  Lab Results   Component Value Date    TRIG 109 06/14/2024    CHOL 239 (H) 06/14/2024    LDLF 177 (H) 10/13/2022    LDLCALC 148 (H) 06/14/2024    HDL 69.3 06/14/2024     Urine Microalbumin  Lab Results   Component Value Date    MICROALBCREA 148.4 (H) 06/14/2024     Weight Management  Wt Readings from Last 3 Encounters:   01/20/25 80.7 kg (178 lb)   12/23/24 79.8 kg (176 lb)   06/10/24 79.6 kg (175 lb 6.4 oz)      There is no height or weight on file to calculate BMI.     Assessment/Plan   Problem List Items Addressed This Visit       Type 2 diabetes mellitus with hyperglycemia, with long-term current use of insulin     Patient's goal A1c is < 7%.  Is pt at goal? No, patient's most recent A1c from 12/23/2024 was 11.9%  Patient's SMBGs are variable, patient's blood sugars in the morning are at goal, typically <100 mg/dL, but later in the day, patient's blood sugar spikes to around 350 mg/dL.     Rationale for plan: Patient states that her income is too high to qualify for [facility], therefore, she wants to look into other medications that are more cost effective. Will look into the copay options for other medications and will talk with patient more in depth next week.    Medication Changes:  CONTINUE  Novolin N 100 units/mL; inject 70 units in the evenings    Future Considerations:  When patient starts another medication, patient may need to decrease insulin back to 60 units, or even lower to ensure she does not have hypoglycemia.    Monitoring and Education:  Answered all patient questions and concerns            Clinical  Pharmacist follow-up: 4/14/2025, Telehealth visit    Patient is not followed in CCM. (If yes, track minutes under compass zachary at each visit)    Continue all meds under the continuation of care with the referring provider and clinical pharmacy team.    Thank you,  Lary Hogan, PharmD  Clinical Pharmacist - Primary Care  [phone]  4/7/2025    Verbal consent to manage patient's drug therapy was obtained from the patient. They were informed they may decline to participate or withdraw from participation in pharmacy services at any time.

## 2025-04-14 ENCOUNTER — APPOINTMENT (OUTPATIENT)
Dept: PHARMACY | Facility: HOSPITAL | Age: 70
End: 2025-04-14
Payer: MEDICARE

## 2025-04-14 DIAGNOSIS — E11.65 TYPE 2 DIABETES MELLITUS WITH HYPERGLYCEMIA, WITH LONG-TERM CURRENT USE OF INSULIN: ICD-10-CM

## 2025-04-14 DIAGNOSIS — Z79.4 TYPE 2 DIABETES MELLITUS WITH HYPERGLYCEMIA, WITH LONG-TERM CURRENT USE OF INSULIN: ICD-10-CM

## 2025-04-14 RX ORDER — PIOGLITAZONE 15 MG/1
15 TABLET ORAL DAILY
Qty: 30 TABLET | Refills: 11 | Status: SHIPPED | OUTPATIENT
Start: 2025-04-14 | End: 2026-04-14

## 2025-04-14 NOTE — PROGRESS NOTES
Clinical Pharmacy Appointment    Patient ID: Brittany Brown is a 69 y.o. female who presents for Diabetes.    Pt is here for Follow Up appointment.     Referring Provider: Valencia Keller MD  PCP: Valencia Keller MD   Last visit with PCP: 2024   Next visit with PCP: not scheduled    Subjective   Drug Interactions  The following drug interactions were noted: Norco, cyclobenzaprine, and trazodone can all have additive CNS depressant effects    Medication System Management  Patient's preferred pharmacy: [id number] in [place]  Adherence/Organization: No concerns at this time  Affordability/Accessibility: per patient income too high for  PAP      HPI  DIABETES MELLITUS TYPE 2:    Does patient follow with Endocrinology: No  Last optometry exam: patient will make appointment for this year    Current diabetic medications include:  Novolin N 100 units/mL; inject 70 units in the evenings    Clarifications to above regimen: None  Adverse Effects: None    Past diabetic medications include:  Metformin XR (side effects)    Glucose Readings:  Glucometer/CGM Type: FreeStyle Pooja 2 and traditional glucometer  Patient tests BG 2 times per day    Current home BG readings (mg/dL): FB-100 mg/dL; PPB-200 mg/dL   Previous home BG readings (mg/dL): FB-98 mg/dL; PPBG: highest was 350 mg/dL    Any episodes of hypoglycemia? Yes, there was an episode where her blood sugar fell to around 69 mg/dL .  Did patient treat episode of hypoglycemia appropriately? Yes, juice or candy  Does the patient have a prescription for ready-to-use Glucagon? No patient has not had a lot of hypoglycemia episodes that require the use of glucagon.    Lifestyle:  Diet: 1-2 meals/day. Not eating past 6:30 pm.  BK: Oatmeal  DN: Hot Dogs, cheese and crackers  Snacks: Chips  Drinks: Water, diet coke  Exercise: does not exercise  Tobacco history: Non-smoker    Secondary Prevention:  Statin? Yes  ACE-I/ARB? Yes  Aspirin? Yes    Pertinent  "PMH Review:  PMH of Pancreatitis: No  PMH of Retinopathy: No  PMH of Urinary Tract Infections: No  PMH of MTC: No  PMH of MEN2: No  UACR/EGFR in last year?: Yes  Albumin/Creatinine Ratio   Date Value Ref Range Status   06/14/2024 148.4 (H) <30.0 ug/mg Creat Final     Albumin/Creatine Ratio   Date Value Ref Range Status   11/30/2020 30.0 0.0 - 30.0 ug/mg crt Final     Immunizations:  Influenza? Yes  COVID? No  Pneumonia? Yes  Shingles? Yes  Hepatitis B? Yes       Objective   Allergies   Allergen Reactions    Adhesive Tape-Silicones Rash     SKIN PEELS OFF     Social History     Social History Narrative    Not on file      Medication Review  Current Outpatient Medications   Medication Instructions    aspirin 81 mg EC tablet 1 tablet, oral, Daily    blood sugar diagnostic (OneTouch Verio test strips) strip 2 times daily, test    blood-glucose sensor (FreeStyle Pooja 3 Plus Sensor) device Use to check blood sugars as directed. Change sensors every 15 days.    blood-glucose sensor (FreeStyle Pooja 3 Sensor) device Use as directed to check glucose    cyclobenzaprine (FLEXERIL) 10 mg, oral, 2 times daily PRN    FreeStyle Pooja 3 [place] misc Use as instructed to check blood sugars.    HYDROcodone-acetaminophen (Norco)  mg tablet 1 tablet, oral, Every 4 hours PRN    ibuprofen 800 mg, oral, 3 times daily PRN    lisinopril 20 mg, oral, Daily    mometasone (Nasonex) 50 mcg/actuation nasal spray 1 spray, Each Nostril, Daily    naloxone (NARCAN) 4 mg, nasal, As needed, May repeat every 2-3 minutes if needed, alternating nostrils, until medical assistance becomes available.    NovoLIN N FlexPen 70 Units, subcutaneous, Daily    pantoprazole (PROTONIX) 40 mg, oral, Daily    pen needle, diabetic 32 gauge x 3/16\" needle Daily, use as    pioglitazone (ACTOS) 15 mg, oral, Daily    potassium chloride CR 20 mEq ER tablet 20 mEq, oral, Daily    rosuvastatin (CRESTOR) 5 mg, oral, Daily    traZODone (DESYREL) 50 mg, oral, Nightly PRN "      Vitals  BP Readings from Last 2 Encounters:   01/20/25 (!) 207/94   12/23/24 116/78     BMI Readings from Last 1 Encounters:   01/20/25 34.76 kg/m²      Labs  A1C  Lab Results   Component Value Date    HGBA1C 11.9 (A) 12/23/2024    HGBA1C 10.9 (A) 06/10/2024    HGBA1C 12.9 (A) 01/11/2024     BMP  Lab Results   Component Value Date    CALCIUM 9.6 06/14/2024     06/14/2024    K 3.3 (L) 06/14/2024    CO2 27 06/14/2024    CL 95 (L) 06/14/2024    BUN 13 06/14/2024    CREATININE 0.82 06/14/2024    EGFR 77 06/14/2024     LFTs  Lab Results   Component Value Date    ALT 23 10/13/2022    AST 21 10/13/2022    ALKPHOS 72 10/13/2022    BILITOT 0.7 10/13/2022     FLP  Lab Results   Component Value Date    TRIG 109 06/14/2024    CHOL 239 (H) 06/14/2024    LDLF 177 (H) 10/13/2022    LDLCALC 148 (H) 06/14/2024    HDL 69.3 06/14/2024     Urine Microalbumin  Lab Results   Component Value Date    MICROALBCREA 148.4 (H) 06/14/2024     Weight Management  Wt Readings from Last 3 Encounters:   01/20/25 80.7 kg (178 lb)   12/23/24 79.8 kg (176 lb)   06/10/24 79.6 kg (175 lb 6.4 oz)      There is no height or weight on file to calculate BMI.     Assessment/Plan   Problem List Items Addressed This Visit       Type 2 diabetes mellitus with hyperglycemia, with long-term current use of insulin     Patient's goal A1c is < 7%.  Is pt at goal? No, patient's most recent A1c from 12/23/2024 was 11.9%  Patient's SMBGs are coming closer to goal.     Rationale for plan: Patient's income is too high per patient for [facility], and the copays are too high for her to be able to take a GLP-1 RA, DPP4i or SGLT2i. Patient does not have a history of heart failure, therefore, patient will start pioglitazone 15 mg to help lower blood sugars and decrease need for insulin. Since patient previously increased the dose of Novolin N to 70 units, will decrease since she did experience one episode where her blood sugar fell into the 60s, in addition to preventing  lows once she starts pioglitazone.    Medication Changes:  START  Pioglitazone 15 mg; take 1 tablet daily  DECREASE  Novolin N 100 units/mL; inject 60 units in the evenings    Future Considerations:  Assess glycemic control to determine if dose needs to be increased    Monitoring and Education:  Counseled patient on the medication including MOA, dose, route, and adverse effects         Relevant Medications    pioglitazone (Actos) 15 mg tablet    Other Relevant Orders    Referral to Clinical Pharmacy       Clinical Pharmacist follow-up: 5/12/2025, Telehealth visit    Patient is not followed in [facility]. (If yes, track minutes under compass zachary at each visit)    Continue all meds under the continuation of care with the referring provider and clinical pharmacy team.    Thank you,  Lary Hogan, PharmD  Clinical Pharmacist - Primary Care  [phone]  4/14/2025    Verbal consent to manage patient's drug therapy was obtained from the patient. They were informed they may decline to participate or withdraw from participation in pharmacy services at any time.

## 2025-04-14 NOTE — ASSESSMENT & PLAN NOTE
Patient's goal A1c is < 7%.  Is pt at goal? No, patient's most recent A1c from 12/23/2024 was 11.9%  Patient's SMBGs are coming closer to goal.     Rationale for plan: Patient's income is too high per patient for [facility], and the copays are too high for her to be able to take a GLP-1 RA, DPP4i or SGLT2i. Patient does not have a history of heart failure, therefore, patient will start pioglitazone 15 mg to help lower blood sugars and decrease need for insulin. Since patient previously increased the dose of Novolin N to 70 units, will decrease since she did experience one episode where her blood sugar fell into the 60s, in addition to preventing lows once she starts pioglitazone.    Medication Changes:  START  Pioglitazone 15 mg; take 1 tablet daily  DECREASE  Novolin N 100 units/mL; inject 60 units in the evenings    Future Considerations:  Assess glycemic control to determine if dose needs to be increased    Monitoring and Education:  Counseled patient on the medication including MOA, dose, route, and adverse effects

## 2025-04-22 ENCOUNTER — TELEPHONE (OUTPATIENT)
Dept: PRIMARY CARE | Facility: CLINIC | Age: 70
End: 2025-04-22
Payer: MEDICARE

## 2025-04-22 DIAGNOSIS — M54.50 BACK PAIN, LUMBOSACRAL: ICD-10-CM

## 2025-04-22 RX ORDER — CYCLOBENZAPRINE HCL 10 MG
10 TABLET ORAL 2 TIMES DAILY PRN
Qty: 30 TABLET | Refills: 0 | Status: SHIPPED | OUTPATIENT
Start: 2025-04-22 | End: 2025-05-22

## 2025-04-22 RX ORDER — HYDROCODONE BITARTRATE AND ACETAMINOPHEN 10; 325 MG/1; MG/1
1 TABLET ORAL EVERY 4 HOURS PRN
Qty: 30 TABLET | Refills: 0 | Status: SHIPPED | OUTPATIENT
Start: 2025-04-22 | End: 2025-05-02

## 2025-04-22 RX ORDER — HYDROCODONE BITARTRATE AND ACETAMINOPHEN 10; 325 MG/1; MG/1
1 TABLET ORAL EVERY 4 HOURS PRN
Qty: 30 TABLET | Refills: 0 | Status: SHIPPED | OUTPATIENT
Start: 2025-04-22 | End: 2025-04-22

## 2025-04-22 NOTE — TELEPHONE ENCOUNTER
Rx Refill Request Telephone Encounter    Name:  Brittany Brown  :  [id number]  Medication Name:    cyclobenzaprine (Flexeril) 10 mg tablet  HYDROcodone-acetaminophen (Norco)  mg tablet   Specific Pharmacy location:  [facility]  Date of last appointment:  24  Date of next appointment:  25

## 2025-05-12 ENCOUNTER — APPOINTMENT (OUTPATIENT)
Dept: PHARMACY | Facility: HOSPITAL | Age: 70
End: 2025-05-12
Payer: MEDICARE

## 2025-06-05 ENCOUNTER — APPOINTMENT (OUTPATIENT)
Dept: PRIMARY CARE | Facility: CLINIC | Age: 70
End: 2025-06-05
Payer: MEDICARE

## 2025-06-05 VITALS
OXYGEN SATURATION: 98 % | SYSTOLIC BLOOD PRESSURE: 190 MMHG | HEIGHT: 60 IN | DIASTOLIC BLOOD PRESSURE: 70 MMHG | HEART RATE: 100 BPM | WEIGHT: 175.4 LBS | TEMPERATURE: 97.4 F | BODY MASS INDEX: 34.44 KG/M2

## 2025-06-05 DIAGNOSIS — E11.65 TYPE 2 DIABETES MELLITUS WITH HYPERGLYCEMIA, WITH LONG-TERM CURRENT USE OF INSULIN: Primary | ICD-10-CM

## 2025-06-05 DIAGNOSIS — G89.29 CHRONIC PAIN OF RIGHT KNEE: ICD-10-CM

## 2025-06-05 DIAGNOSIS — I10 HYPERTENSION, BENIGN: ICD-10-CM

## 2025-06-05 DIAGNOSIS — M25.561 CHRONIC PAIN OF RIGHT KNEE: ICD-10-CM

## 2025-06-05 DIAGNOSIS — E11.51 TYPE 2 DIABETES MELLITUS WITH DIABETIC PERIPHERAL ANGIOPATHY WITHOUT GANGRENE, WITH LONG-TERM CURRENT USE OF INSULIN (MULTI): ICD-10-CM

## 2025-06-05 DIAGNOSIS — M54.50 BACK PAIN, LUMBOSACRAL: ICD-10-CM

## 2025-06-05 DIAGNOSIS — E66.811 CLASS 1 OBESITY WITH SERIOUS COMORBIDITY AND BODY MASS INDEX (BMI) OF 34.0 TO 34.9 IN ADULT, UNSPECIFIED OBESITY TYPE: ICD-10-CM

## 2025-06-05 DIAGNOSIS — Z79.4 TYPE 2 DIABETES MELLITUS WITH DIABETIC PERIPHERAL ANGIOPATHY WITHOUT GANGRENE, WITH LONG-TERM CURRENT USE OF INSULIN (MULTI): ICD-10-CM

## 2025-06-05 DIAGNOSIS — Z79.4 TYPE 2 DIABETES MELLITUS WITH HYPERGLYCEMIA, WITH LONG-TERM CURRENT USE OF INSULIN: Primary | ICD-10-CM

## 2025-06-05 PROCEDURE — 1160F RVW MEDS BY RX/DR IN RCRD: CPT | Performed by: FAMILY MEDICINE

## 2025-06-05 PROCEDURE — 1159F MED LIST DOCD IN RCRD: CPT | Performed by: FAMILY MEDICINE

## 2025-06-05 PROCEDURE — 3078F DIAST BP <80 MM HG: CPT | Performed by: FAMILY MEDICINE

## 2025-06-05 PROCEDURE — 99214 OFFICE O/P EST MOD 30 MIN: CPT | Performed by: FAMILY MEDICINE

## 2025-06-05 PROCEDURE — 3008F BODY MASS INDEX DOCD: CPT | Performed by: FAMILY MEDICINE

## 2025-06-05 PROCEDURE — 4010F ACE/ARB THERAPY RXD/TAKEN: CPT | Performed by: FAMILY MEDICINE

## 2025-06-05 PROCEDURE — 3077F SYST BP >= 140 MM HG: CPT | Performed by: FAMILY MEDICINE

## 2025-06-05 ASSESSMENT — PATIENT HEALTH QUESTIONNAIRE - PHQ9
SUM OF ALL RESPONSES TO PHQ9 QUESTIONS 1 AND 2: 0
1. LITTLE INTEREST OR PLEASURE IN DOING THINGS: NOT AT ALL
2. FEELING DOWN, DEPRESSED OR HOPELESS: NOT AT ALL

## 2025-06-05 ASSESSMENT — ENCOUNTER SYMPTOMS
LOSS OF SENSATION IN FEET: 0
DEPRESSION: 0
OCCASIONAL FEELINGS OF UNSTEADINESS: 0

## 2025-06-05 NOTE — PROGRESS NOTES
Subjective   Patient ID: Brittany Brown is a 70 y.o. female who presents for Med Management.  Patient presents for follow up regarding diabetes, HTN, and chronic pain.    For diabetes, the lowest she she goes is 69 in AM.  Typical numbers in AM are .  During the day, glucose will go to a high of 300.  She notes that candy will drive it up.  Talking with  pharmacy,  Lary, which has been helpful.  She was prescribed Actos, but has been afraid to take it, due to listed potential side effects.  After discussing with her, she has said she will  start it tomorrow.    Using trazodone for sleep.    It hasn't been working as well as she'd like.    Right knee pain even though had TKR.  Also has back pain that will flare at times.  Having vicodin available for use when bad, has been helpful.  OARRS:  Valencia Keller MD on 6/5/2025  5:01 PM  I have personally reviewed the OARRS report for Brittany Brown. I have considered the risks of abuse, dependence, addiction and diversion and I believe that it is clinically appropriate for Brittany Brown to be prescribed this medication    Is the patient prescribed a combination of a benzodiazepine and opioid?  No    Last Urine Drug Screen / ordered today: Yes  No results found for this or any previous visit (from the past 8760 hours).  N/A    Controlled Substance Agreement:  Date of the Last Agreement: 6/11/24  Reviewed Controlled Substance Agreement including but not limited to the benefits, risks, and alternatives to treatment with a Controlled Substance medication(s).    Opioids:  What is the patient's goal of therapy? Manage chronic pain  Is this being achieved with current treatment? yes    I have calculated the patient's Morphine Dose Equivalent (MED):   I have considered referral to Pain Management and/or a specialist, and do not feel it is necessary at this time.    I feel that it is clinically indicated to continue this current medication regimen after consideration of  alternative therapies, and other non-opioid treatment.    Opioid Risk Screening:  No data recorded    Pain Assessment:  No data recorded          Review of Systems    Objective   BP (!) 190/70 (BP Location: Right arm, Patient Position: Sitting, BP Cuff Size: Large adult)   Pulse 100   Temp 36.3 °C (97.4 °F) (Temporal)   Ht 1.524 m (5')   Wt 79.6 kg (175 lb 6.4 oz)   SpO2 98%   BMI 34.26 kg/m²     Physical Exam  Vitals reviewed.   Constitutional:       Appearance: Normal appearance.   Cardiovascular:      Rate and Rhythm: Normal rate and regular rhythm.      Heart sounds: No murmur heard.  Pulmonary:      Effort: Pulmonary effort is normal.      Breath sounds: Normal breath sounds.   Musculoskeletal:      Right lower leg: No edema.      Left lower leg: No edema.   Neurological:      Mental Status: She is alert.   Psychiatric:         Mood and Affect: Mood normal.         Behavior: Behavior normal.           Assessment/Plan   Problem List Items Addressed This Visit       Back pain, lumbosacral    Relevant Orders    Opiate/Opioid/Benzo Prescription Compliance    Hypertension, benign    Relevant Medications    lisinopril 40 mg tablet    Type 2 diabetes mellitus with hyperglycemia, with long-term current use of insulin - Primary    Relevant Orders    Comprehensive Metabolic Panel    Hemoglobin A1C    Lipid Panel    Albumin-Creatinine Ratio, Urine Random    Type 2 diabetes mellitus with diabetic peripheral angiopathy without gangrene, with long-term current use of insulin (Multi)    Chronic pain of right knee    Relevant Orders    Opiate/Opioid/Benzo Prescription Compliance     Other Visit Diagnoses         Class 1 obesity with serious comorbidity and body mass index (BMI) of 34.0 to 34.9 in adult, unspecified obesity type

## 2025-06-05 NOTE — Clinical Note
Can you let patient know that when reviewing her chart, noticed the BP was very high. I've increased her lisinopril to 40 mg, and sent prescription to her pharmacy. She can take 2 of the 20 mg until gone.

## 2025-06-07 RX ORDER — LISINOPRIL 40 MG/1
40 TABLET ORAL DAILY
Qty: 90 TABLET | Refills: 1 | Status: SHIPPED | OUTPATIENT
Start: 2025-06-07 | End: 2025-12-04

## 2025-06-07 NOTE — PATIENT INSTRUCTIONS
Diabetes, which has not been well controlled.  Will check A1C with fasting blood work.  Risk of  complications occurring  are high if control is not improved.  Currently using 70 units insulin N at night.  Working with  pharmacy for  help with medication management.  Actos prescribed, but not yet taken.  Will start tomorrow.  Continuous glucose monitor to track glucose has been helpful.  Continue  aspirin 81 mg daily.  Taking rosuvastatin statin to reduce risk of heart attack and stroke.                                  Treatment goals include:  HgbA1C less than 7.0  /80 on consistent basis, with no higher than 140/85  LDL cholesterol less than 100, and HDL cholesterol above 40.  Yearly retinal exam  Check feet periodically for sores or decreased sensation  Exercise at least 150 minutes weekly.  Work toward a goal BMI of less than 25.     BP high today.  Increase lisinopril to 40 mg, and monitor at home if able.  Follow up in 3 months.     For sleep, can increase trazodone to  100 mg as needed,, since 50 mg not working as well as had been.     For chronic pain, using vicodin when  bad.  Urine Drug Screen done 1/11/24, so is due, and Controlled Substance Agreement done, 6/11/24.    Has seen surgeon for hernia repair.  Will need to get diabetes under better control prior to surgery.    Check fasting blood work some morning.  Fast for 12 hours prior to having blood drawn.  You should drink plenty of water, and can have black tea or black coffee, if you'd like.

## 2025-06-10 DIAGNOSIS — M54.50 BACK PAIN, LUMBOSACRAL: ICD-10-CM

## 2025-06-10 DIAGNOSIS — F51.01 PRIMARY INSOMNIA: ICD-10-CM

## 2025-06-10 RX ORDER — TRAZODONE HYDROCHLORIDE 50 MG/1
50 TABLET ORAL NIGHTLY PRN
Qty: 30 TABLET | Refills: 3 | Status: SHIPPED | OUTPATIENT
Start: 2025-06-10 | End: 2026-06-10

## 2025-06-10 RX ORDER — CYCLOBENZAPRINE HCL 10 MG
10 TABLET ORAL 2 TIMES DAILY PRN
Qty: 30 TABLET | Refills: 0 | Status: SHIPPED | OUTPATIENT
Start: 2025-06-10 | End: 2025-07-10

## 2025-06-12 LAB
ALBUMIN SERPL-MCNC: 4.3 G/DL (ref 3.6–5.1)
ALBUMIN/CREAT UR: NORMAL MG/G CREAT
ALP SERPL-CCNC: 81 U/L (ref 37–153)
ALT SERPL-CCNC: 17 U/L (ref 6–29)
ANION GAP SERPL CALCULATED.4IONS-SCNC: 13 MMOL/L (CALC) (ref 7–17)
AST SERPL-CCNC: 21 U/L (ref 10–35)
BILIRUB SERPL-MCNC: 0.6 MG/DL (ref 0.2–1.2)
BUN SERPL-MCNC: 12 MG/DL (ref 7–25)
CALCIUM SERPL-MCNC: 9.5 MG/DL (ref 8.6–10.4)
CHLORIDE SERPL-SCNC: 101 MMOL/L (ref 98–110)
CHOLEST SERPL-MCNC: 223 MG/DL
CHOLEST/HDLC SERPL: 2.8 (CALC)
CO2 SERPL-SCNC: 24 MMOL/L (ref 20–32)
CREAT SERPL-MCNC: 0.6 MG/DL (ref 0.6–1)
CREAT UR-MCNC: 192 MG/DL (ref 20–275)
EGFRCR SERPLBLD CKD-EPI 2021: 96 ML/MIN/1.73M2
EST. AVERAGE GLUCOSE BLD GHB EST-MCNC: 252 MG/DL
EST. AVERAGE GLUCOSE BLD GHB EST-SCNC: 13.9 MMOL/L
GLUCOSE SERPL-MCNC: 179 MG/DL (ref 65–99)
HBA1C MFR BLD: 10.4 %
HDLC SERPL-MCNC: 79 MG/DL
LDLC SERPL CALC-MCNC: 124 MG/DL (CALC)
MICROALBUMIN UR-MCNC: <0.2 MG/DL
NONHDLC SERPL-MCNC: 144 MG/DL (CALC)
POTASSIUM SERPL-SCNC: 3.2 MMOL/L (ref 3.5–5.3)
PROT SERPL-MCNC: 7.4 G/DL (ref 6.1–8.1)
SODIUM SERPL-SCNC: 138 MMOL/L (ref 135–146)
TRIGL SERPL-MCNC: 92 MG/DL

## 2025-06-15 LAB
1OH-MIDAZOLAM UR-MCNC: NEGATIVE NG/ML
7AMINOCLONAZEPAM UR-MCNC: NEGATIVE NG/ML
A-OH ALPRAZ UR-MCNC: NEGATIVE NG/ML
A-OH-TRIAZOLAM UR-MCNC: NEGATIVE NG/ML
AMPHETAMINES UR QL: NEGATIVE NG/ML
BARBITURATES UR QL: NEGATIVE NG/ML
BZE UR QL: NEGATIVE NG/ML
CODEINE UR-MCNC: NEGATIVE NG/ML
CREAT UR-MCNC: 183.7 MG/DL
DRUG SCREEN COMMENT UR-IMP: NORMAL
EDDP UR-MCNC: NEGATIVE NG/ML
FENTANYL UR-MCNC: NEGATIVE NG/ML
HYDROCODONE UR-MCNC: NEGATIVE NG/ML
HYDROMORPHONE UR-MCNC: NEGATIVE NG/ML
LORAZEPAM UR-MCNC: NEGATIVE NG/ML
METHADONE UR-MCNC: NEGATIVE NG/ML
MORPHINE UR-MCNC: NEGATIVE NG/ML
NORDIAZEPAM UR-MCNC: NEGATIVE NG/ML
NORFENTANYL UR-MCNC: NEGATIVE NG/ML
NORHYDROCODONE UR CFM-MCNC: NEGATIVE NG/ML
NOROXYCODONE UR CFM-MCNC: NEGATIVE NG/ML
NORTRAMADOL UR-MCNC: NEGATIVE NG/ML
OH-ETHYLFLURAZ UR-MCNC: NEGATIVE NG/ML
OXAZEPAM UR-MCNC: NEGATIVE NG/ML
OXIDANTS UR QL: NEGATIVE MCG/ML
OXYCODONE UR CFM-MCNC: NEGATIVE NG/ML
OXYMORPHONE UR CFM-MCNC: NEGATIVE NG/ML
PCP UR QL: NEGATIVE NG/ML
PH UR: 6.7 [PH] (ref 4.5–9)
QUEST 6 ACETYLMORPHINE: NEGATIVE NG/ML
QUEST NOTES AND COMMENTS: NORMAL
QUEST ZOLPIDEM: NEGATIVE NG/ML
TEMAZEPAM UR-MCNC: NEGATIVE NG/ML
THC UR QL: NEGATIVE NG/ML
TRAMADOL UR-MCNC: NEGATIVE NG/ML
ZOLPIDEM PHENYL-4-CARB UR CFM-MCNC: NEGATIVE NG/ML

## 2025-06-30 DIAGNOSIS — K21.00 GASTROESOPHAGEAL REFLUX DISEASE WITH ESOPHAGITIS WITHOUT HEMORRHAGE: ICD-10-CM

## 2025-06-30 RX ORDER — PANTOPRAZOLE SODIUM 40 MG/1
40 TABLET, DELAYED RELEASE ORAL DAILY
Qty: 90 TABLET | Refills: 1 | Status: SHIPPED | OUTPATIENT
Start: 2025-06-30

## 2025-07-11 ENCOUNTER — OFFICE VISIT (OUTPATIENT)
Dept: URGENT CARE | Age: 70
End: 2025-07-11
Payer: MEDICARE

## 2025-07-11 VITALS
RESPIRATION RATE: 19 BRPM | OXYGEN SATURATION: 96 % | HEIGHT: 63 IN | TEMPERATURE: 98.7 F | DIASTOLIC BLOOD PRESSURE: 80 MMHG | WEIGHT: 173 LBS | SYSTOLIC BLOOD PRESSURE: 132 MMHG | HEART RATE: 89 BPM | BODY MASS INDEX: 30.65 KG/M2

## 2025-07-11 DIAGNOSIS — S39.92XA INJURY OF BACK, INITIAL ENCOUNTER: Primary | ICD-10-CM

## 2025-07-11 DIAGNOSIS — S49.91XA INJURY OF RIGHT SHOULDER, INITIAL ENCOUNTER: ICD-10-CM

## 2025-07-11 ASSESSMENT — PAIN SCALES - GENERAL: PAINLEVEL_OUTOF10: 8

## 2025-07-11 NOTE — PROGRESS NOTES
Unable to perform visit, 42011    Back fall/injury with new radiation down bilateral legs in context of hx of reported ?discectomy. Needs CT/MRI. Additionally, right shoulder injury & possible wrist & ankle injury. ER for imaging needs.

## 2025-07-12 ENCOUNTER — APPOINTMENT (OUTPATIENT)
Dept: RADIOLOGY | Facility: HOSPITAL | Age: 70
End: 2025-07-12
Payer: MEDICARE

## 2025-07-12 ENCOUNTER — HOSPITAL ENCOUNTER (EMERGENCY)
Facility: HOSPITAL | Age: 70
Discharge: HOME | End: 2025-07-12
Payer: MEDICARE

## 2025-07-12 VITALS
SYSTOLIC BLOOD PRESSURE: 197 MMHG | HEART RATE: 96 BPM | WEIGHT: 170 LBS | BODY MASS INDEX: 30.12 KG/M2 | HEIGHT: 63 IN | TEMPERATURE: 97 F | DIASTOLIC BLOOD PRESSURE: 87 MMHG | OXYGEN SATURATION: 99 % | RESPIRATION RATE: 18 BRPM

## 2025-07-12 DIAGNOSIS — S80.00XA CONTUSION OF KNEE, UNSPECIFIED LATERALITY, INITIAL ENCOUNTER: ICD-10-CM

## 2025-07-12 DIAGNOSIS — R03.0 ELEVATED BLOOD PRESSURE READING: ICD-10-CM

## 2025-07-12 DIAGNOSIS — M54.50 ACUTE BILATERAL LOW BACK PAIN WITHOUT SCIATICA: Primary | ICD-10-CM

## 2025-07-12 PROCEDURE — 73130 X-RAY EXAM OF HAND: CPT | Mod: BILATERAL PROCEDURE | Performed by: RADIOLOGY

## 2025-07-12 PROCEDURE — 73564 X-RAY EXAM KNEE 4 OR MORE: CPT | Mod: 50

## 2025-07-12 PROCEDURE — 73564 X-RAY EXAM KNEE 4 OR MORE: CPT | Mod: BILATERAL PROCEDURE | Performed by: RADIOLOGY

## 2025-07-12 PROCEDURE — 73130 X-RAY EXAM OF HAND: CPT | Mod: 50

## 2025-07-12 PROCEDURE — 70450 CT HEAD/BRAIN W/O DYE: CPT | Performed by: STUDENT IN AN ORGANIZED HEALTH CARE EDUCATION/TRAINING PROGRAM

## 2025-07-12 PROCEDURE — 72131 CT LUMBAR SPINE W/O DYE: CPT

## 2025-07-12 PROCEDURE — 73110 X-RAY EXAM OF WRIST: CPT | Mod: 50

## 2025-07-12 PROCEDURE — 72125 CT NECK SPINE W/O DYE: CPT

## 2025-07-12 PROCEDURE — 72125 CT NECK SPINE W/O DYE: CPT | Performed by: STUDENT IN AN ORGANIZED HEALTH CARE EDUCATION/TRAINING PROGRAM

## 2025-07-12 PROCEDURE — 99284 EMERGENCY DEPT VISIT MOD MDM: CPT | Mod: 25

## 2025-07-12 PROCEDURE — 70450 CT HEAD/BRAIN W/O DYE: CPT

## 2025-07-12 PROCEDURE — 73110 X-RAY EXAM OF WRIST: CPT | Mod: BILATERAL PROCEDURE | Performed by: RADIOLOGY

## 2025-07-12 PROCEDURE — 72131 CT LUMBAR SPINE W/O DYE: CPT | Performed by: STUDENT IN AN ORGANIZED HEALTH CARE EDUCATION/TRAINING PROGRAM

## 2025-07-12 ASSESSMENT — PAIN DESCRIPTION - PAIN TYPE: TYPE: ACUTE PAIN

## 2025-07-12 ASSESSMENT — PAIN DESCRIPTION - ORIENTATION: ORIENTATION: LOWER

## 2025-07-12 ASSESSMENT — PAIN - FUNCTIONAL ASSESSMENT: PAIN_FUNCTIONAL_ASSESSMENT: 0-10

## 2025-07-12 ASSESSMENT — PAIN SCALES - GENERAL: PAINLEVEL_OUTOF10: 8

## 2025-07-12 ASSESSMENT — PAIN DESCRIPTION - LOCATION: LOCATION: BACK

## 2025-07-12 NOTE — ED PROVIDER NOTES
HPI   CC: Fall and Back Pain     Patient is a 70-year-old female with PMH diabetes, hypertension presenting with concern for fall and back pain.  Patient states on Monday at 8:30 AM she was at a gas station and walked into the store.  When she did this she tripped on the corner of a rug. Patient landed on her right side.  She does not think she hit her head and does not take blood thinners.  She currently endorses a headache located in the back of her head.  Patient denies loss of consciousness, vomiting, dizziness, numbness, weakness, tingling, seizures, change in speech or vision, and amnesia.  Endorses pain in her bilateral hands and wrist in her bilateral knees and in her lower back.  She has a history of back surgery. Denying red flag symptoms including IV drug use, alcohol abuse, renal failure, fevers, night pain, immunosuppression, incontinence, retention, history of cancer.  She was seen at urgent care yesterday where she was told she needs a CT scan of her back.  She has been taking Motrin and Flexeril at home.  The Motrin helps but the Flexeril does not.  Pt has a history of hypertension and takes daily lisinopril 20 mg. She took her medicine today. BP elevated in triage at 197/87. She denies chest pain, shortness of breath, dizziness, headache.         ROS: 10-point review of systems was performed and is otherwise negative except as noted in HPI.    Limitations to history: N/A  Independent Historians: Self   External Records Reviewed: Outpatient notes in EMR    Past Medical History: Noncontributory except per HPI     Past Surgical History: Noncontributory except per HPI     Family History: Reviewed and noncontributory     Social History:  Denies tobacco. Denies ETOH. Denies illicit drugs.    RX Allergies[1]    Home Meds:   Current Outpatient Medications   Medication Instructions    aspirin 81 mg EC tablet 1 tablet, Daily    blood-glucose sensor (FreeStyle Pooja 3 Plus Sensor) device Use to check blood  "sugars as directed. Change sensors every 15 days.    blood-glucose sensor (FreeStyle Pooja 3 Sensor) device Use as directed to check glucose    cyclobenzaprine (FLEXERIL) 10 mg, oral, 2 times daily PRN    FreeStyle Pooja 3 [place] misc Use as instructed to check blood sugars.    ibuprofen 800 mg, oral, 3 times daily PRN    lisinopril 40 mg, oral, Daily    mometasone (Nasonex) 50 mcg/actuation nasal spray 1 spray, Daily    naloxone (NARCAN) 4 mg, nasal, As needed, May repeat every 2-3 minutes if needed, alternating nostrils, until medical assistance becomes available.    NovoLIN N FlexPen 70 Units, subcutaneous, Daily    pantoprazole (PROTONIX) 40 mg, oral, Daily    pen needle, diabetic 32 gauge x 3/16\" needle Daily    pioglitazone (ACTOS) 15 mg, oral, Daily    potassium chloride CR 20 mEq ER tablet 20 mEq, oral, Daily    rosuvastatin (CRESTOR) 5 mg, oral, Daily    traZODone (DESYREL) 50 mg, oral, Nightly PRN        Physical Exam     ED Triage Vitals [07/12/25 1531]   Temperature Heart Rate Respirations BP   36.1 °C (97 °F) 96 18 (!) 197/87      Pulse Ox Temp Source Heart Rate Source Patient Position   99 % Temporal Monitor Sitting      BP Location FiO2 (%)     Right arm --         Vitals and nursing note reviewed.   Physical Exam  Constitutional:       General: She is not in acute distress.     Appearance: Normal appearance. She is not ill-appearing, toxic-appearing or diaphoretic.   HENT:      Head: Normocephalic and atraumatic.      Comments: There is no scalp tenderness, swelling, erythema, warmth, deformity, crepitus, hematoma, bruising.  No raccoon eyes or Larose sign.     Right Ear: Tympanic membrane, ear canal and external ear normal. There is no impacted cerumen.      Left Ear: Tympanic membrane, ear canal and external ear normal. There is no impacted cerumen.      Ears:      Comments: No hemotympanum laterally     Mouth/Throat:      Mouth: Mucous membranes are moist.      Pharynx: Oropharynx is clear. No " oropharyngeal exudate or posterior oropharyngeal erythema.   Eyes:      General: No scleral icterus.        Right eye: No discharge.         Left eye: No discharge.      Extraocular Movements: Extraocular movements intact.      Conjunctiva/sclera: Conjunctivae normal.      Pupils: Pupils are equal, round, and reactive to light.   Cardiovascular:      Rate and Rhythm: Normal rate and regular rhythm.      Heart sounds: Normal heart sounds. No murmur heard.     No friction rub. No gallop.      Comments: DP and PT 2+ B/L  Pulmonary:      Effort: Pulmonary effort is normal. No respiratory distress.      Breath sounds: Normal breath sounds. No stridor. No wheezing, rhonchi or rales.   Abdominal:      General: Abdomen is flat. Bowel sounds are normal. There is no distension.      Palpations: Abdomen is soft.      Tenderness: There is no abdominal tenderness. There is no right CVA tenderness, left CVA tenderness, guarding or rebound.   Musculoskeletal:         General: Normal range of motion.      Cervical back: Normal range of motion and neck supple. No rigidity or tenderness.      Comments: No midline C/T/L-spine tenderness  Bilateral lumbar paraspinal muscle tenderness    There is no joint deformity, crepitus, bruising, swelling, erythema, or warmth. Compartments are soft. ROM full to bilateral wrist flexion/extension/radial deviation/ulnar deviation, finger flexion/extension, thumb opposition, elbow flexion/extension/pronation/supination, shoulder abduction/adduction/flexion/extension/internal rotation/external rotation.    ROM full to bilateral hip flexion/extension/adduction/abduction/internal rotation/external rotation, knee flexion/extension, ankle dorsi/plantarflexion/inversion/eversion.    Tenderness to dorsum of bilateral wrist, anterior knees bilaterally.  No snuffbox tenderness bilaterally   Lymphadenopathy:      Cervical: No cervical adenopathy.   Skin:     General: Skin is warm and dry.      Capillary Refill:  Capillary refill takes less than 2 seconds.   Neurological:      General: No focal deficit present.      Mental Status: She is alert and oriented to person, place, and time.      Comments:     L1-L2 cremasteric reflex (inner thigh sensation): Not tested   L2 adduct thigh, cross legs: Intact   L3 extend knee: Intact   L4 dorsiflex ankle (up): Intact   L5 point great toe up: Intact  L2-L4: Knee reflex: intact   S1 flex knee: Intact   S2 plantarflex toes: Intact   S3-5: Groin/perianal sensation: Sensation to touch intact.  Did not assess rectal tone.      T1 move fingers apart: Intact   T2-12 trunk sensation: Intact      C1-2, 3, 4 sensation to head and neck: Intact   C5 deltoid motor: Intact   C6 biceps motor: Intact   C7 extension of the wrist and fingers: Intact    C8 flex fingers: Intact    Psychiatric:         Mood and Affect: Mood normal.         Behavior: Behavior normal.         Diagnostic Results      Labs Reviewed - No data to display      CT head wo IV contrast    (Results Pending)   CT cervical spine wo IV contrast    (Results Pending)   CT lumbar spine wo IV contrast    (Results Pending)   XR wrist 3+ views bilateral    (Results Pending)   XR hand 3+ views bilateral    (Results Pending)   XR knee 4+ views bilateral    (Results Pending)       ED Course & MDM   Assessment/Plan:   Medications - No data to display        ED Prescriptions    None         Chronic Medical Conditions Significantly Affecting Care:      Escalation of Care: Appropriate for outpatient management    Discussion of Management with Other Providers:  I discussed the patient/results with: attending physician  ***    Counseling: Spoke with the patient and discussed today´s findings, in addition to providing specific details for the plan of care and expected course.  Patient was given the opportunity to ask questions.    Discussed return precautions and importance of follow-up.  Advised to follow-up with ***.  Advised to return to the ED  for changing or worsening symptoms, new symptoms, complaint specific precautions, and precautions listed on the discharge paperwork.  Educated on the common potential side effects of medications prescribed.    I advised the patient that the emergency evaluation and treatment provided today doesn't end their need for medical care. It is very important that they follow-up with their primary care provider or other specialist as instructed.    The plan of care was mutually agreed upon with the patient. The patient and/or family were given the opportunity to ask questions. All questions asked today in the ED were answered to the best of my ability with today's information.    I specifically advised the patient to return to the ED for changing or worsening symptoms, worrisome new symptoms, or for any complaint specific precautions listed on the discharge paperwork.    Procedure  Procedures         [1]   Allergies  Allergen Reactions    Adhesive Tape-Silicones Rash     SKIN PEELS OFF      [id number]          ED Course & MDM   Assessment/Plan:   Medications - No data to display   ED Course as of 07/14/25 1448   Mon Jul 14, 2025   1445 Patient is a 70-year-old female presenting to the ED with concern for fall and back pain.  Patient is hypertensive with /87.  Patient is not complaining of chest pain, shortness of breath, palpitations, dizziness, or lightheadedness that would suggest workup for end organ damage is necessary.  Workup for asymptomatic hypertension is not indicated.  Vital signs otherwise stable and patient is nontoxic-appearing.  On exam she is neurovascularly intact to all extremities.  She is neurologically intact and no signs of basilar skull fracture.  There are no motor or deficits on exam.  She is denying red flag symptoms.  I do not think emergent MRI imaging of the spine is needed at this time.  Offered pain control in ED, however patient declined.  CT head, C-spine, lumbar spine obtained with no acute abnormalities.  Awaiting results of x-ray wrist, knee, hand.  Patient was signed out to Hector Burch PA-C pending results of imaging and final disposition.  Plan to discharge if imaging negative.  Prior to signout I stressed to patient the importance of managing her blood pressure.  She does take blood pressure medicine at home.  I advised her to keep a log recording of her blood pressures and follow-up with her primary doctor for further management.  Patient signed out to Hector Burch PA-C at 6 PM. [VS]      ED Course User Index  [VS] Kelli Payne PA-C         Diagnoses as of 07/14/25 1448   Acute bilateral low back pain without sciatica   Contusion of knee, unspecified laterality, initial encounter   Elevated blood pressure reading       ED Prescriptions    None         Chronic Medical Conditions Significantly Affecting Care:      Escalation of Care: Appropriate for outpatient management    Counseling: Spoke with the patient and discussed today´s findings, in  addition to providing specific details for the plan of care and expected course.  Patient was given the opportunity to ask questions.    Discussed return precautions and importance of follow-up.  Advised to follow-up with PCP.  Advised to return to the ED for changing or worsening symptoms, new symptoms, complaint specific precautions, and precautions listed on the discharge paperwork.  Educated on the common potential side effects of medications prescribed.    I advised the patient that the emergency evaluation and treatment provided today doesn't end their need for medical care. It is very important that they follow-up with their primary care provider or other specialist as instructed.    The plan of care was mutually agreed upon with the patient. The patient and/or family were given the opportunity to ask questions. All questions asked today in the ED were answered to the best of my ability with today's information.    I specifically advised the patient to return to the ED for changing or worsening symptoms, worrisome new symptoms, or for any complaint specific precautions listed on the discharge paperwork.    Procedure  Procedures         [1]   Allergies  Allergen Reactions    Adhesive Tape-Silicones Rash     SKIN PEELS OFF        Kelli Payne PA-C  07/14/25 4016

## 2025-07-12 NOTE — ED TRIAGE NOTES
Patient presents to the ED by POV from home for lower back pain and knee pain following a fall. Patient states she tripped at a gas station on Monday and fell. Reports she has had intermittent numbness and tingling down her legs since the fall. H/o back surgery.

## 2025-07-13 NOTE — PROGRESS NOTES
Patient essentially here for a fall evaluation she had a CAT scan due to her pain which was performed is essentially unremarkable she has no neurologic deficits.  She had other x-rays that was signed out to me to follow I reviewed them myself and do not see fracture of the hands of the knees or the wrist or ankles it does appear that she has hardware that is intact.  I discussed with her that her results of taking quite some time to come back she is comfortable leaving with me calling if any significant fractures found ultimately the reports came back and there is no significant injury she is discharged home understands importance of following up with her primary care doctor margarita Burch PA-C